# Patient Record
Sex: FEMALE | Race: WHITE | Employment: UNEMPLOYED | URBAN - NONMETROPOLITAN AREA
[De-identification: names, ages, dates, MRNs, and addresses within clinical notes are randomized per-mention and may not be internally consistent; named-entity substitution may affect disease eponyms.]

---

## 2018-09-19 ENCOUNTER — APPOINTMENT (OUTPATIENT)
Dept: GENERAL RADIOLOGY | Age: 43
DRG: 177 | End: 2018-09-19

## 2018-09-19 ENCOUNTER — HOSPITAL ENCOUNTER (INPATIENT)
Age: 43
LOS: 7 days | Discharge: HOME | DRG: 177 | End: 2018-09-26
Attending: INTERNAL MEDICINE | Admitting: INTERNAL MEDICINE
Payer: MEDICAID

## 2018-09-19 ENCOUNTER — APPOINTMENT (OUTPATIENT)
Dept: CT IMAGING | Age: 43
DRG: 177 | End: 2018-09-19

## 2018-09-19 DIAGNOSIS — J45.901 ASTHMA WITH ACUTE EXACERBATION, UNSPECIFIED ASTHMA SEVERITY, UNSPECIFIED WHETHER PERSISTENT: ICD-10-CM

## 2018-09-19 DIAGNOSIS — J15.211 PNEUMONIA OF LEFT LOWER LOBE DUE TO METHICILLIN SUSCEPTIBLE STAPHYLOCOCCUS AUREUS (MSSA) (HCC): ICD-10-CM

## 2018-09-19 DIAGNOSIS — J45.41 MODERATE PERSISTENT ASTHMATIC BRONCHITIS WITH ACUTE EXACERBATION: Primary | ICD-10-CM

## 2018-09-19 DIAGNOSIS — R09.02 HYPOXIA: ICD-10-CM

## 2018-09-19 LAB
ALLEN TEST: POSITIVE
ANION GAP SERPL CALCULATED.3IONS-SCNC: 11 MEQ/L (ref 8–16)
AVERAGE GLUCOSE: 90 MG/DL (ref 70–126)
BACTERIA: ABNORMAL
BASE EXCESS (CALCULATED): 1.2 MMOL/L (ref -2.5–2.5)
BASOPHILS # BLD: 0.4 %
BASOPHILS ABSOLUTE: 0 THOU/MM3 (ref 0–0.1)
BILIRUBIN URINE: NEGATIVE
BLOOD, URINE: NEGATIVE
BUN BLDV-MCNC: 13 MG/DL (ref 7–22)
CALCIUM SERPL-MCNC: 9.1 MG/DL (ref 8.5–10.5)
CASTS: ABNORMAL /LPF
CASTS: ABNORMAL /LPF
CHARACTER, URINE: CLEAR
CHLORIDE BLD-SCNC: 99 MEQ/L (ref 98–111)
CO2: 24 MEQ/L (ref 23–33)
COLLECTED BY:: ABNORMAL
COLOR: YELLOW
CREAT SERPL-MCNC: 0.7 MG/DL (ref 0.4–1.2)
CRYSTALS: ABNORMAL
DEVICE: ABNORMAL
EOSINOPHIL # BLD: 5.3 %
EOSINOPHILS ABSOLUTE: 0.6 THOU/MM3 (ref 0–0.4)
EPITHELIAL CELLS, UA: ABNORMAL /HPF
ERYTHROCYTE [DISTWIDTH] IN BLOOD BY AUTOMATED COUNT: 12.8 % (ref 11.5–14.5)
ERYTHROCYTE [DISTWIDTH] IN BLOOD BY AUTOMATED COUNT: 40.4 FL (ref 35–45)
GFR SERPL CREATININE-BSD FRML MDRD: > 90 ML/MIN/1.73M2
GLUCOSE BLD-MCNC: 121 MG/DL (ref 70–108)
GLUCOSE, URINE: 100 MG/DL
HBA1C MFR BLD: 5 % (ref 4.4–6.4)
HCO3: 28 MMOL/L (ref 23–28)
HCT VFR BLD CALC: 45.3 % (ref 37–47)
HEMOGLOBIN: 15.4 GM/DL (ref 12–16)
IFIO2: 4
IMMATURE GRANS (ABS): 0.06 THOU/MM3 (ref 0–0.07)
IMMATURE GRANULOCYTES: 0.5 %
KETONES, URINE: NEGATIVE
LACTIC ACID: 0.9 MMOL/L (ref 0.5–2.2)
LEUKOCYTE ESTERASE, URINE: NEGATIVE
LYMPHOCYTES # BLD: 9.6 %
LYMPHOCYTES ABSOLUTE: 1.1 THOU/MM3 (ref 1–4.8)
MCH RBC QN AUTO: 29.7 PG (ref 26–33)
MCHC RBC AUTO-ENTMCNC: 34 GM/DL (ref 32.2–35.5)
MCV RBC AUTO: 87.3 FL (ref 81–99)
MISCELLANEOUS LAB TEST RESULT: ABNORMAL
MONOCYTES # BLD: 9.7 %
MONOCYTES ABSOLUTE: 1.1 THOU/MM3 (ref 0.4–1.3)
NITRITE, URINE: NEGATIVE
NUCLEATED RED BLOOD CELLS: 0 /100 WBC
O2 SATURATION: 98 %
OSMOLALITY CALCULATION: 269.6 MOSMOL/KG (ref 275–300)
PCO2: 51 MMHG (ref 35–45)
PH BLOOD GAS: 7.35 (ref 7.35–7.45)
PH UA: 5.5
PLATELET # BLD: 312 THOU/MM3 (ref 130–400)
PMV BLD AUTO: 9 FL (ref 9.4–12.4)
PO2: 115 MMHG (ref 71–104)
POTASSIUM SERPL-SCNC: 4.1 MEQ/L (ref 3.5–5.2)
PROTEIN UA: NEGATIVE MG/DL
RBC # BLD: 5.19 MILL/MM3 (ref 4.2–5.4)
RBC URINE: ABNORMAL /HPF
RENAL EPITHELIAL, UA: ABNORMAL
SEG NEUTROPHILS: 74.5 %
SEGMENTED NEUTROPHILS ABSOLUTE COUNT: 8.7 THOU/MM3 (ref 1.8–7.7)
SODIUM BLD-SCNC: 134 MEQ/L (ref 135–145)
SOURCE, BLOOD GAS: ABNORMAL
SPECIFIC GRAVITY UA: 1.02 (ref 1–1.03)
TROPONIN T: 0.02 NG/ML
TROPONIN T: < 0.01 NG/ML
TSH SERPL DL<=0.05 MIU/L-ACNC: 1.86 UIU/ML (ref 0.4–4.2)
UROBILINOGEN, URINE: 0.2 EU/DL
WBC # BLD: 11.7 THOU/MM3 (ref 4.8–10.8)
WBC UA: ABNORMAL /HPF
YEAST: ABNORMAL

## 2018-09-19 PROCEDURE — 6360000004 HC RX CONTRAST MEDICATION: Performed by: PHYSICIAN ASSISTANT

## 2018-09-19 PROCEDURE — 84484 ASSAY OF TROPONIN QUANT: CPT

## 2018-09-19 PROCEDURE — 83036 HEMOGLOBIN GLYCOSYLATED A1C: CPT

## 2018-09-19 PROCEDURE — 71275 CT ANGIOGRAPHY CHEST: CPT

## 2018-09-19 PROCEDURE — 2709999900 HC NON-CHARGEABLE SUPPLY

## 2018-09-19 PROCEDURE — 94760 N-INVAS EAR/PLS OXIMETRY 1: CPT

## 2018-09-19 PROCEDURE — 6370000000 HC RX 637 (ALT 250 FOR IP): Performed by: FAMILY MEDICINE

## 2018-09-19 PROCEDURE — 1200000003 HC TELEMETRY R&B

## 2018-09-19 PROCEDURE — 94645 CONT INHLJ TX EACH ADDL HOUR: CPT

## 2018-09-19 PROCEDURE — 82803 BLOOD GASES ANY COMBINATION: CPT

## 2018-09-19 PROCEDURE — 96375 TX/PRO/DX INJ NEW DRUG ADDON: CPT

## 2018-09-19 PROCEDURE — 94640 AIRWAY INHALATION TREATMENT: CPT

## 2018-09-19 PROCEDURE — 93005 ELECTROCARDIOGRAM TRACING: CPT | Performed by: INTERNAL MEDICINE

## 2018-09-19 PROCEDURE — 99285 EMERGENCY DEPT VISIT HI MDM: CPT

## 2018-09-19 PROCEDURE — 2700000000 HC OXYGEN THERAPY PER DAY

## 2018-09-19 PROCEDURE — 6360000002 HC RX W HCPCS: Performed by: PHYSICIAN ASSISTANT

## 2018-09-19 PROCEDURE — 94644 CONT INHLJ TX 1ST HOUR: CPT

## 2018-09-19 PROCEDURE — 96374 THER/PROPH/DIAG INJ IV PUSH: CPT

## 2018-09-19 PROCEDURE — 99222 1ST HOSP IP/OBS MODERATE 55: CPT | Performed by: INTERNAL MEDICINE

## 2018-09-19 PROCEDURE — 6360000002 HC RX W HCPCS: Performed by: INTERNAL MEDICINE

## 2018-09-19 PROCEDURE — 81001 URINALYSIS AUTO W/SCOPE: CPT

## 2018-09-19 PROCEDURE — 6370000000 HC RX 637 (ALT 250 FOR IP): Performed by: PHYSICIAN ASSISTANT

## 2018-09-19 PROCEDURE — 85025 COMPLETE CBC W/AUTO DIFF WBC: CPT

## 2018-09-19 PROCEDURE — 6370000000 HC RX 637 (ALT 250 FOR IP): Performed by: INTERNAL MEDICINE

## 2018-09-19 PROCEDURE — 87040 BLOOD CULTURE FOR BACTERIA: CPT

## 2018-09-19 PROCEDURE — 2580000003 HC RX 258: Performed by: INTERNAL MEDICINE

## 2018-09-19 PROCEDURE — 84443 ASSAY THYROID STIM HORMONE: CPT

## 2018-09-19 PROCEDURE — 83605 ASSAY OF LACTIC ACID: CPT

## 2018-09-19 PROCEDURE — 87086 URINE CULTURE/COLONY COUNT: CPT

## 2018-09-19 PROCEDURE — 36600 WITHDRAWAL OF ARTERIAL BLOOD: CPT

## 2018-09-19 PROCEDURE — 6370000000 HC RX 637 (ALT 250 FOR IP): Performed by: EMERGENCY MEDICINE

## 2018-09-19 PROCEDURE — 36415 COLL VENOUS BLD VENIPUNCTURE: CPT

## 2018-09-19 PROCEDURE — 2500000003 HC RX 250 WO HCPCS: Performed by: INTERNAL MEDICINE

## 2018-09-19 PROCEDURE — 71046 X-RAY EXAM CHEST 2 VIEWS: CPT

## 2018-09-19 PROCEDURE — 80048 BASIC METABOLIC PNL TOTAL CA: CPT

## 2018-09-19 RX ORDER — ASPIRIN 325 MG
325 TABLET ORAL ONCE
Status: COMPLETED | OUTPATIENT
Start: 2018-09-19 | End: 2018-09-19

## 2018-09-19 RX ORDER — POTASSIUM CHLORIDE 7.45 MG/ML
10 INJECTION INTRAVENOUS PRN
Status: DISCONTINUED | OUTPATIENT
Start: 2018-09-19 | End: 2018-09-26 | Stop reason: HOSPADM

## 2018-09-19 RX ORDER — IPRATROPIUM BROMIDE AND ALBUTEROL SULFATE 2.5; .5 MG/3ML; MG/3ML
1 SOLUTION RESPIRATORY (INHALATION) ONCE
Status: DISCONTINUED | OUTPATIENT
Start: 2018-09-19 | End: 2018-09-19

## 2018-09-19 RX ORDER — POTASSIUM CHLORIDE 20MEQ/15ML
40 LIQUID (ML) ORAL PRN
Status: DISCONTINUED | OUTPATIENT
Start: 2018-09-19 | End: 2018-09-26 | Stop reason: HOSPADM

## 2018-09-19 RX ORDER — SODIUM CHLORIDE 0.9 % (FLUSH) 0.9 %
10 SYRINGE (ML) INJECTION PRN
Status: DISCONTINUED | OUTPATIENT
Start: 2018-09-19 | End: 2018-09-26 | Stop reason: HOSPADM

## 2018-09-19 RX ORDER — IPRATROPIUM BROMIDE AND ALBUTEROL SULFATE 2.5; .5 MG/3ML; MG/3ML
1 SOLUTION RESPIRATORY (INHALATION)
Status: DISCONTINUED | OUTPATIENT
Start: 2018-09-19 | End: 2018-09-25

## 2018-09-19 RX ORDER — OMEPRAZOLE 10 MG/1
10 CAPSULE, DELAYED RELEASE ORAL DAILY
COMMUNITY

## 2018-09-19 RX ORDER — METOPROLOL TARTRATE 5 MG/5ML
5 INJECTION INTRAVENOUS ONCE
Status: COMPLETED | OUTPATIENT
Start: 2018-09-19 | End: 2018-09-19

## 2018-09-19 RX ORDER — GUAIFENESIN 600 MG/1
600 TABLET, EXTENDED RELEASE ORAL 2 TIMES DAILY
Status: DISCONTINUED | OUTPATIENT
Start: 2018-09-19 | End: 2018-09-26 | Stop reason: HOSPADM

## 2018-09-19 RX ORDER — AZITHROMYCIN 250 MG/1
500 TABLET, FILM COATED ORAL ONCE
Status: COMPLETED | OUTPATIENT
Start: 2018-09-19 | End: 2018-09-19

## 2018-09-19 RX ORDER — ONDANSETRON 2 MG/ML
4 INJECTION INTRAMUSCULAR; INTRAVENOUS EVERY 6 HOURS PRN
Status: DISCONTINUED | OUTPATIENT
Start: 2018-09-19 | End: 2018-09-26 | Stop reason: HOSPADM

## 2018-09-19 RX ORDER — ACETAMINOPHEN 325 MG/1
650 TABLET ORAL EVERY 4 HOURS PRN
Status: DISCONTINUED | OUTPATIENT
Start: 2018-09-19 | End: 2018-09-26 | Stop reason: HOSPADM

## 2018-09-19 RX ORDER — NAPROXEN 250 MG/1
250 TABLET ORAL 2 TIMES DAILY WITH MEALS
COMMUNITY
End: 2020-01-27

## 2018-09-19 RX ORDER — BUSPIRONE HYDROCHLORIDE 10 MG/1
10 TABLET ORAL 3 TIMES DAILY
Status: DISCONTINUED | OUTPATIENT
Start: 2018-09-19 | End: 2018-09-26 | Stop reason: HOSPADM

## 2018-09-19 RX ORDER — IPRATROPIUM BROMIDE AND ALBUTEROL SULFATE 2.5; .5 MG/3ML; MG/3ML
1 SOLUTION RESPIRATORY (INHALATION) ONCE
Status: COMPLETED | OUTPATIENT
Start: 2018-09-19 | End: 2018-09-19

## 2018-09-19 RX ORDER — METHYLPREDNISOLONE SODIUM SUCCINATE 40 MG/ML
40 INJECTION, POWDER, LYOPHILIZED, FOR SOLUTION INTRAMUSCULAR; INTRAVENOUS EVERY 8 HOURS
Status: DISCONTINUED | OUTPATIENT
Start: 2018-09-19 | End: 2018-09-20

## 2018-09-19 RX ORDER — POTASSIUM CHLORIDE 20 MEQ/1
40 TABLET, EXTENDED RELEASE ORAL PRN
Status: DISCONTINUED | OUTPATIENT
Start: 2018-09-19 | End: 2018-09-26 | Stop reason: HOSPADM

## 2018-09-19 RX ORDER — ALBUTEROL SULFATE 2.5 MG/3ML
2.5 SOLUTION RESPIRATORY (INHALATION) ONCE
Status: COMPLETED | OUTPATIENT
Start: 2018-09-19 | End: 2018-09-19

## 2018-09-19 RX ORDER — SODIUM CHLORIDE 0.9 % (FLUSH) 0.9 %
10 SYRINGE (ML) INJECTION EVERY 12 HOURS SCHEDULED
Status: DISCONTINUED | OUTPATIENT
Start: 2018-09-19 | End: 2018-09-26 | Stop reason: HOSPADM

## 2018-09-19 RX ORDER — AZITHROMYCIN 250 MG/1
250 TABLET, FILM COATED ORAL DAILY
Status: DISCONTINUED | OUTPATIENT
Start: 2018-09-20 | End: 2018-09-20

## 2018-09-19 RX ORDER — 0.9 % SODIUM CHLORIDE 0.9 %
500 INTRAVENOUS SOLUTION INTRAVENOUS ONCE
Status: COMPLETED | OUTPATIENT
Start: 2018-09-19 | End: 2018-09-20

## 2018-09-19 RX ORDER — CETIRIZINE HYDROCHLORIDE 10 MG/1
10 TABLET ORAL PRN
COMMUNITY
End: 2020-01-27

## 2018-09-19 RX ORDER — DILTIAZEM HYDROCHLORIDE 5 MG/ML
10 INJECTION INTRAVENOUS ONCE
Status: COMPLETED | OUTPATIENT
Start: 2018-09-19 | End: 2018-09-19

## 2018-09-19 RX ORDER — METHYLPREDNISOLONE SODIUM SUCCINATE 125 MG/2ML
125 INJECTION, POWDER, LYOPHILIZED, FOR SOLUTION INTRAMUSCULAR; INTRAVENOUS ONCE
Status: COMPLETED | OUTPATIENT
Start: 2018-09-19 | End: 2018-09-19

## 2018-09-19 RX ORDER — NICOTINE 21 MG/24HR
1 PATCH, TRANSDERMAL 24 HOURS TRANSDERMAL DAILY
Status: DISCONTINUED | OUTPATIENT
Start: 2018-09-19 | End: 2018-09-20

## 2018-09-19 RX ORDER — LORAZEPAM 2 MG/ML
1 INJECTION INTRAMUSCULAR ONCE
Status: COMPLETED | OUTPATIENT
Start: 2018-09-19 | End: 2018-09-19

## 2018-09-19 RX ORDER — ACETAMINOPHEN 325 MG/1
650 TABLET ORAL ONCE
Status: COMPLETED | OUTPATIENT
Start: 2018-09-19 | End: 2018-09-19

## 2018-09-19 RX ADMIN — ALBUTEROL SULFATE 15 MG/HR: 2.5 SOLUTION RESPIRATORY (INHALATION) at 06:53

## 2018-09-19 RX ADMIN — METHYLPREDNISOLONE SODIUM SUCCINATE 40 MG: 40 INJECTION, POWDER, FOR SOLUTION INTRAMUSCULAR; INTRAVENOUS at 20:45

## 2018-09-19 RX ADMIN — IPRATROPIUM BROMIDE AND ALBUTEROL SULFATE 1 AMPULE: .5; 3 SOLUTION RESPIRATORY (INHALATION) at 21:51

## 2018-09-19 RX ADMIN — Medication 10 ML: at 20:45

## 2018-09-19 RX ADMIN — BUSPIRONE HYDROCHLORIDE 10 MG: 10 TABLET ORAL at 20:44

## 2018-09-19 RX ADMIN — GUAIFENESIN 600 MG: 600 TABLET, EXTENDED RELEASE ORAL at 15:01

## 2018-09-19 RX ADMIN — ALBUTEROL SULFATE 2.5 MG: 2.5 SOLUTION RESPIRATORY (INHALATION) at 06:29

## 2018-09-19 RX ADMIN — ALBUTEROL SULFATE 15 MG/HR: 2.5 SOLUTION RESPIRATORY (INHALATION) at 08:06

## 2018-09-19 RX ADMIN — ASPIRIN 325 MG: 325 TABLET ORAL at 20:49

## 2018-09-19 RX ADMIN — DILTIAZEM HYDROCHLORIDE 10 MG: 5 INJECTION INTRAVENOUS at 22:20

## 2018-09-19 RX ADMIN — BUSPIRONE HYDROCHLORIDE 10 MG: 10 TABLET ORAL at 15:01

## 2018-09-19 RX ADMIN — ACETAMINOPHEN 650 MG: 325 TABLET ORAL at 10:56

## 2018-09-19 RX ADMIN — METHYLPREDNISOLONE SODIUM SUCCINATE 125 MG: 125 INJECTION, POWDER, FOR SOLUTION INTRAMUSCULAR; INTRAVENOUS at 06:55

## 2018-09-19 RX ADMIN — AZITHROMYCIN 500 MG: 250 TABLET, FILM COATED ORAL at 17:09

## 2018-09-19 RX ADMIN — ACETAMINOPHEN 650 MG: 325 TABLET ORAL at 22:22

## 2018-09-19 RX ADMIN — LORAZEPAM 1 MG: 2 INJECTION INTRAMUSCULAR; INTRAVENOUS at 10:01

## 2018-09-19 RX ADMIN — SODIUM CHLORIDE 500 ML: 9 INJECTION, SOLUTION INTRAVENOUS at 22:20

## 2018-09-19 RX ADMIN — METOPROLOL TARTRATE 5 MG: 1 INJECTION, SOLUTION INTRAVENOUS at 18:04

## 2018-09-19 RX ADMIN — GUAIFENESIN 600 MG: 600 TABLET, EXTENDED RELEASE ORAL at 20:44

## 2018-09-19 RX ADMIN — IPRATROPIUM BROMIDE AND ALBUTEROL SULFATE 1 AMPULE: .5; 3 SOLUTION RESPIRATORY (INHALATION) at 15:45

## 2018-09-19 RX ADMIN — IPRATROPIUM BROMIDE AND ALBUTEROL SULFATE 1 AMPULE: .5; 3 SOLUTION RESPIRATORY (INHALATION) at 06:05

## 2018-09-19 RX ADMIN — IOPAMIDOL 85 ML: 755 INJECTION, SOLUTION INTRAVENOUS at 09:08

## 2018-09-19 RX ADMIN — ENOXAPARIN SODIUM 40 MG: 40 INJECTION SUBCUTANEOUS at 15:01

## 2018-09-19 ASSESSMENT — PAIN DESCRIPTION - PAIN TYPE
TYPE: ACUTE PAIN
TYPE: ACUTE PAIN

## 2018-09-19 ASSESSMENT — PAIN SCALES - GENERAL
PAINLEVEL_OUTOF10: 7
PAINLEVEL_OUTOF10: 3
PAINLEVEL_OUTOF10: 5
PAINLEVEL_OUTOF10: 6

## 2018-09-19 ASSESSMENT — ENCOUNTER SYMPTOMS
NAUSEA: 0
DIARRHEA: 0
EYE PAIN: 0
SORE THROAT: 1
SHORTNESS OF BREATH: 1
COUGH: 1
RHINORRHEA: 1
EYE DISCHARGE: 0
BACK PAIN: 0
ABDOMINAL PAIN: 0
WHEEZING: 1
VOMITING: 0

## 2018-09-19 ASSESSMENT — PAIN DESCRIPTION - ORIENTATION: ORIENTATION: LEFT

## 2018-09-19 ASSESSMENT — PAIN DESCRIPTION - DESCRIPTORS
DESCRIPTORS: HEADACHE
DESCRIPTORS: TIGHTNESS

## 2018-09-19 ASSESSMENT — PAIN DESCRIPTION - LOCATION
LOCATION: CHEST
LOCATION: HEAD

## 2018-09-19 NOTE — ED TRIAGE NOTES
Pt presents to the ED through triage with complaints of SOB, wheezing, and cough. Pt states that she started to get SOB yesterday morning. Hx of asthma. EKG completed. Pt has labored breathing upon arrival.  States nonproductive cough. Pt alert and oriented. VS and assessment as documented.

## 2018-09-19 NOTE — PROGRESS NOTES
Dr Jasmin Reyna paged again regarding patients heart rate. Patients heart rate continues to be in the 120s. Orders for urinalysis.

## 2018-09-19 NOTE — ED PROVIDER NOTES
exudate, posterior oropharyngeal edema or posterior oropharyngeal erythema. Eyes: Conjunctivae and EOM are normal.   Neck: Normal range of motion. Neck supple. No JVD present. Cardiovascular: Regular rhythm, normal heart sounds, intact distal pulses and normal pulses. Tachycardia present. Exam reveals no gallop and no friction rub. No murmur heard. Pulmonary/Chest: Effort normal. Tachypnea noted. No respiratory distress. She has no decreased breath sounds. She has wheezes. She has no rhonchi. She has no rales. Wheezing throughout. Tachypnea. Negative pedal edema. Abdominal: Soft. Bowel sounds are normal. She exhibits no distension. There is no tenderness. There is no rebound, no guarding and no CVA tenderness. Musculoskeletal: Normal range of motion. She exhibits no edema. Neurological: She is alert and oriented to person, place, and time. She exhibits normal muscle tone. Coordination normal.   Skin: Skin is warm and dry. No rash noted. She is not diaphoretic. Nursing note and vitals reviewed. DIFFERENTIAL DIAGNOSIS:   Were discussed with the patient. DIAGNOSTIC RESULTS     EKG: All EKG's are interpreted by the Emergency Department Physician who either signs or Co-signs this chart in the absence of a cardiologist.    Jess Nails. Rate: 119 bpm  OR interval: 156 ms  QRS duration: 80 ms  QTc: 458 ms  P-R-T axes: 80, 77, 72  Sinus Tachycardia. No STEMI. RADIOLOGY: non-plain film images(s) such as CT, Ultrasound and MRI are read by the radiologist.    CTA Chest W WO Contrast   Final Result   Within The limitations discussed above there is no acute process identified. No central or proximal branch pulmonary emboli are seen. **This report has been created using voice recognition software. It may contain minor errors which are inherent in voice recognition technology. **      Final report electronically signed by Dr. Nandini Koch on 9/19/2018 9:30 AM      XR CHEST STANDARD (2 VW)   Final Result   1. Unremarkable PA and lateral views of the chest.            **This report has been created using voice recognition software. It may contain minor errors which are inherent in voice recognition technology. **      Final report electronically signed by Dr. Brooks العلي on 9/19/2018 6:59 AM          LABS:     Labs Reviewed   CBC WITH AUTO DIFFERENTIAL - Abnormal; Notable for the following:        Result Value    WBC 11.7 (*)     MPV 9.0 (*)     Segs Absolute 8.7 (*)     Eosinophils # 0.6 (*)     All other components within normal limits   BASIC METABOLIC PANEL - Abnormal; Notable for the following:     Sodium 134 (*)     Glucose 121 (*)     All other components within normal limits   BLOOD GAS, ARTERIAL - Abnormal; Notable for the following:     PCO2 51 (*)     PO2 115 (*)     All other components within normal limits   OSMOLALITY - Abnormal; Notable for the following:     Osmolality Calc 269.6 (*)     All other components within normal limits   CULTURE BLOOD #1   CULTURE BLOOD #2   TROPONIN   LACTIC ACID, PLASMA   ANION GAP   GLOMERULAR FILTRATION RATE, ESTIMATED       EMERGENCY DEPARTMENT COURSE:   Vitals:    Vitals:    09/19/18 0941 09/19/18 0948 09/19/18 0950 09/19/18 1057   BP:    128/60   Pulse:   134 130   Resp:   30 24   Temp:       TempSrc:       SpO2: 92% (!) 88% 94% 94%   Weight:       Height:           6:34 AM: The patient was seen and evaluated. MDM:  The patient was evaluated in the ED for shortness of breath, wheezing, and cough. She is 83% on room air upon arrival. Upon examination, Wheezing throughout. Tachypnea. Negative pedal edema. Tachycardia. Mild respiratory distress. Radiological results showed Unremarkable PA and lateral views of the chest. Laboratory results revealed negative troponin, WBC 11.7, PO2 115, PCO2 51, and sodium 134. Patient was initially treated with DuoNeb with minimal improvement.   She was given albuterol treatment as well as 2 hour albuterol with Solu-Medrol

## 2018-09-19 NOTE — PLAN OF CARE
Problem: Impaired respiratory status  Goal: Clear lung sounds  Outcome: Ongoing  Pt has expiratory wheezing. txs to help improve lung aeration.

## 2018-09-19 NOTE — H&P
chest.      Past Medical History         Diagnosis Date    Anxiety     Asthma     Obesity        Past Surgical History         Procedure Laterality Date    CARDIOVASCULAR STRESS TEST  08/24/11    EF 67%    DIAGNOSTIC CARDIAC CATH LAB PROCEDURE  08/29/11    EF 60%    DOPPLER ECHOCARDIOGRAPHY  08/23/11    EF 60%    KNEE ARTHROSCOPY      vicky knee    TONSILLECTOMY           Medications prior to admission      Prior to Admission medications    Medication Sig Start Date End Date Taking? Authorizing Provider   naproxen (NAPROSYN) 250 MG tablet Take 250 mg by mouth 2 times daily (with meals)   Yes Historical Provider, MD   omeprazole (PRILOSEC) 10 MG delayed release capsule Take 10 mg by mouth daily   Yes Historical Provider, MD   cetirizine (ZYRTEC) 10 MG tablet Take 10 mg by mouth daily   Yes Historical Provider, MD       Allergies     Patient has no known allergies. Family History       Family History   Problem Relation Age of Onset    Hypertension Mother     Hypertension Father        Social History       Social History     Social History    Marital status:      Spouse name: N/A    Number of children: N/A    Years of education: N/A     Social History Main Topics    Smoking status: Current Every Day Smoker     Packs/day: 1.00     Years: 14.00     Types: Cigarettes    Smokeless tobacco: Never Used    Alcohol use No    Drug use: No    Sexual activity: Not Asked     Other Topics Concern    None     Social History Narrative    None     TOBACCO:   reports that she has been smoking Cigarettes. She has a 14.00 pack-year smoking history. She has never used smokeless tobacco.  ETOH:   reports that she does not drink alcohol. Review of systems     Pertinent positives as noted in the HPI. All other systems reviewed and negative.     ROS      Physical examination     /60   Pulse 128   Temp 98 °F (36.7 °C) (Oral)   Resp 22   Ht 5' 9\" (1.753 m)   Wt (!) 315 lb 8 oz (143.1 kg)   SpO2 94% BMI 46.59 kg/m²     General appearance:  No apparent distress, appears stated age and cooperative. Obese. HEENT:  Normocephalic. Pupils equal, round, and reactive to light. Extraocular motion intact. Conjunctivae/corneas clear. Nose symmetric without evidence of discharge. Oral mucosa moist without erythema or exudate. Neck: Supple, with full range of motion. No thyromegaly. No lymph node swelling. Cardiovascular:  Regular rate and rhythm with normal S1/S2 without murmurs, rubs or gallops. Respiratory:  Poor air movement with diffuse wheezing intermittently  Abdomen: Soft, non-tender, non-distended with normal bowel sounds. Musculoskeletal:  Full range of motion without deformity. Neurologic: No focal sensory/motor deficits. Cranial nerves: II-XII intact, grossly non-focal.  Lymphatic: No cervical lymphadenopathy. Psychiatric:  Alert and oriented. Normal judgement. Conversational.  Vascular: Dorsalis pedis and radial pulses palpable bilaterally 2+. Genitourinary: Deferred  Skin: Diffuse psoriatic skin lesions. Labs and imaging     Recent Labs      09/19/18   0600   WBC  11.7*   HGB  15.4   HCT  45.3   PLT  312     Recent Labs      09/19/18   0600   NA  134*   K  4.1   CL  99   CO2  24   BUN  13   CREATININE  0.7   CALCIUM  9.1     No results for input(s): AST, ALT, BILIDIR, BILITOT, ALKPHOS in the last 72 hours. No results for input(s): INR in the last 72 hours. No results for input(s): Margarito Car in the last 72 hours. Urinalysis:      Lab Results   Component Value Date    NITRU NEGATIVE 11/22/2015    WBCUA 5-10 02/07/2014    BACTERIA FEW 02/07/2014    RBCUA 0-2 02/07/2014    BLOODU NEGATIVE 11/22/2015    SPECGRAV >1.030 11/22/2015     Radiology:     CXR: I have reviewed the CXR with the following interpretation: Increased pulmonary vascularity with no evidence for hyperinflation of the lungs no evidence of pneumonia.   EKG:  I have reviewed the EKG with the following interpretation:

## 2018-09-19 NOTE — PROGRESS NOTES
Dr Jose Miguel Ndiaye updated on heart rate in the 120s. Also patient requesting something for anxiety. New orders received.

## 2018-09-20 LAB
ALLEN TEST: POSITIVE
ANION GAP SERPL CALCULATED.3IONS-SCNC: 10 MEQ/L (ref 8–16)
BASE EXCESS (CALCULATED): 2.6 MMOL/L (ref -2.5–2.5)
BUN BLDV-MCNC: 11 MG/DL (ref 7–22)
CALCIUM SERPL-MCNC: 9.3 MG/DL (ref 8.5–10.5)
CHLORIDE BLD-SCNC: 103 MEQ/L (ref 98–111)
CO2: 25 MEQ/L (ref 23–33)
COLLECTED BY:: ABNORMAL
CREAT SERPL-MCNC: 0.7 MG/DL (ref 0.4–1.2)
DEVICE: ABNORMAL
EKG ATRIAL RATE: 109 BPM
EKG ATRIAL RATE: 119 BPM
EKG ATRIAL RATE: 124 BPM
EKG P AXIS: 47 DEGREES
EKG P AXIS: 55 DEGREES
EKG P AXIS: 80 DEGREES
EKG P-R INTERVAL: 148 MS
EKG P-R INTERVAL: 154 MS
EKG P-R INTERVAL: 156 MS
EKG Q-T INTERVAL: 324 MS
EKG Q-T INTERVAL: 326 MS
EKG Q-T INTERVAL: 342 MS
EKG QRS DURATION: 80 MS
EKG QRS DURATION: 80 MS
EKG QRS DURATION: 82 MS
EKG QTC CALCULATION (BAZETT): 458 MS
EKG QTC CALCULATION (BAZETT): 460 MS
EKG QTC CALCULATION (BAZETT): 465 MS
EKG R AXIS: 50 DEGREES
EKG R AXIS: 59 DEGREES
EKG R AXIS: 77 DEGREES
EKG T AXIS: 30 DEGREES
EKG T AXIS: 36 DEGREES
EKG T AXIS: 72 DEGREES
EKG VENTRICULAR RATE: 109 BPM
EKG VENTRICULAR RATE: 119 BPM
EKG VENTRICULAR RATE: 124 BPM
ERYTHROCYTE [DISTWIDTH] IN BLOOD BY AUTOMATED COUNT: 13.5 % (ref 11.5–14.5)
ERYTHROCYTE [DISTWIDTH] IN BLOOD BY AUTOMATED COUNT: 43.8 FL (ref 35–45)
FLU A ANTIGEN: NEGATIVE
FLU B ANTIGEN: NEGATIVE
GFR SERPL CREATININE-BSD FRML MDRD: > 90 ML/MIN/1.73M2
GLUCOSE BLD-MCNC: 153 MG/DL (ref 70–108)
GLUCOSE BLD-MCNC: 155 MG/DL (ref 70–108)
HCO3: 28 MMOL/L (ref 23–28)
HCT VFR BLD CALC: 43.9 % (ref 37–47)
HEMOGLOBIN: 14.6 GM/DL (ref 12–16)
IFIO2: 2
LV EF: 63 %
LVEF MODALITY: NORMAL
MAGNESIUM: 2.2 MG/DL (ref 1.6–2.4)
MCH RBC QN AUTO: 29.8 PG (ref 26–33)
MCHC RBC AUTO-ENTMCNC: 33.3 GM/DL (ref 32.2–35.5)
MCV RBC AUTO: 89.6 FL (ref 81–99)
O2 SATURATION: 93 %
ORGANISM: ABNORMAL
PATHOLOGIST REVIEW: ABNORMAL
PCO2: 42 MMHG (ref 35–45)
PH BLOOD GAS: 7.42 (ref 7.35–7.45)
PHOSPHORUS: 2.5 MG/DL (ref 2.4–4.7)
PLATELET # BLD: 356 THOU/MM3 (ref 130–400)
PMV BLD AUTO: 9.1 FL (ref 9.4–12.4)
PO2: 67 MMHG (ref 71–104)
POTASSIUM SERPL-SCNC: 4.9 MEQ/L (ref 3.5–5.2)
PROCALCITONIN: 0.09 NG/ML (ref 0.01–0.09)
RBC # BLD: 4.9 MILL/MM3 (ref 4.2–5.4)
SODIUM BLD-SCNC: 138 MEQ/L (ref 135–145)
SOURCE, BLOOD GAS: ABNORMAL
TROPONIN T: 0.01 NG/ML
TROPONIN T: < 0.01 NG/ML
URINE CULTURE, ROUTINE: ABNORMAL
WBC # BLD: 32.1 THOU/MM3 (ref 4.8–10.8)

## 2018-09-20 PROCEDURE — 2580000003 HC RX 258: Performed by: INTERNAL MEDICINE

## 2018-09-20 PROCEDURE — 80048 BASIC METABOLIC PNL TOTAL CA: CPT

## 2018-09-20 PROCEDURE — 83735 ASSAY OF MAGNESIUM: CPT

## 2018-09-20 PROCEDURE — 36600 WITHDRAWAL OF ARTERIAL BLOOD: CPT

## 2018-09-20 PROCEDURE — 84100 ASSAY OF PHOSPHORUS: CPT

## 2018-09-20 PROCEDURE — 99223 1ST HOSP IP/OBS HIGH 75: CPT | Performed by: INTERNAL MEDICINE

## 2018-09-20 PROCEDURE — 6370000000 HC RX 637 (ALT 250 FOR IP): Performed by: FAMILY MEDICINE

## 2018-09-20 PROCEDURE — 87804 INFLUENZA ASSAY W/OPTIC: CPT

## 2018-09-20 PROCEDURE — 6370000000 HC RX 637 (ALT 250 FOR IP): Performed by: INTERNAL MEDICINE

## 2018-09-20 PROCEDURE — 99233 SBSQ HOSP IP/OBS HIGH 50: CPT | Performed by: INTERNAL MEDICINE

## 2018-09-20 PROCEDURE — 94640 AIRWAY INHALATION TREATMENT: CPT

## 2018-09-20 PROCEDURE — 1200000003 HC TELEMETRY R&B

## 2018-09-20 PROCEDURE — 93005 ELECTROCARDIOGRAM TRACING: CPT | Performed by: INTERNAL MEDICINE

## 2018-09-20 PROCEDURE — 6360000002 HC RX W HCPCS: Performed by: INTERNAL MEDICINE

## 2018-09-20 PROCEDURE — 93306 TTE W/DOPPLER COMPLETE: CPT

## 2018-09-20 PROCEDURE — 93010 ELECTROCARDIOGRAM REPORT: CPT | Performed by: NUCLEAR MEDICINE

## 2018-09-20 PROCEDURE — 85027 COMPLETE CBC AUTOMATED: CPT

## 2018-09-20 PROCEDURE — 99254 IP/OBS CNSLTJ NEW/EST MOD 60: CPT | Performed by: INTERNAL MEDICINE

## 2018-09-20 PROCEDURE — 84484 ASSAY OF TROPONIN QUANT: CPT

## 2018-09-20 PROCEDURE — 36415 COLL VENOUS BLD VENIPUNCTURE: CPT

## 2018-09-20 PROCEDURE — 82803 BLOOD GASES ANY COMBINATION: CPT

## 2018-09-20 PROCEDURE — 2700000000 HC OXYGEN THERAPY PER DAY

## 2018-09-20 PROCEDURE — 82948 REAGENT STRIP/BLOOD GLUCOSE: CPT

## 2018-09-20 PROCEDURE — 93010 ELECTROCARDIOGRAM REPORT: CPT | Performed by: INTERNAL MEDICINE

## 2018-09-20 PROCEDURE — 94760 N-INVAS EAR/PLS OXIMETRY 1: CPT

## 2018-09-20 PROCEDURE — 84145 PROCALCITONIN (PCT): CPT

## 2018-09-20 RX ORDER — CALCIUM CARBONATE 200(500)MG
500 TABLET,CHEWABLE ORAL 3 TIMES DAILY PRN
Status: DISCONTINUED | OUTPATIENT
Start: 2018-09-20 | End: 2018-09-26 | Stop reason: HOSPADM

## 2018-09-20 RX ORDER — ALBUTEROL SULFATE 90 UG/1
2 AEROSOL, METERED RESPIRATORY (INHALATION) EVERY 4 HOURS PRN
Status: DISCONTINUED | OUTPATIENT
Start: 2018-09-20 | End: 2018-09-26 | Stop reason: HOSPADM

## 2018-09-20 RX ORDER — HYDROXYZINE HYDROCHLORIDE 25 MG/1
25 TABLET, FILM COATED ORAL 4 TIMES DAILY PRN
Status: DISCONTINUED | OUTPATIENT
Start: 2018-09-20 | End: 2018-09-26 | Stop reason: HOSPADM

## 2018-09-20 RX ORDER — ASPIRIN 81 MG/1
81 TABLET ORAL DAILY
Status: DISCONTINUED | OUTPATIENT
Start: 2018-09-20 | End: 2018-09-26 | Stop reason: HOSPADM

## 2018-09-20 RX ORDER — METHYLPREDNISOLONE SODIUM SUCCINATE 40 MG/ML
40 INJECTION, POWDER, LYOPHILIZED, FOR SOLUTION INTRAMUSCULAR; INTRAVENOUS EVERY 6 HOURS
Status: DISCONTINUED | OUTPATIENT
Start: 2018-09-20 | End: 2018-09-20

## 2018-09-20 RX ORDER — METHYLPREDNISOLONE SODIUM SUCCINATE 40 MG/ML
40 INJECTION, POWDER, LYOPHILIZED, FOR SOLUTION INTRAMUSCULAR; INTRAVENOUS EVERY 12 HOURS
Status: DISCONTINUED | OUTPATIENT
Start: 2018-09-21 | End: 2018-09-25

## 2018-09-20 RX ORDER — ALBUTEROL SULFATE 2.5 MG/3ML
2.5 SOLUTION RESPIRATORY (INHALATION) EVERY 6 HOURS PRN
Status: DISCONTINUED | OUTPATIENT
Start: 2018-09-20 | End: 2018-09-26 | Stop reason: HOSPADM

## 2018-09-20 RX ORDER — NICOTINE POLACRILEX 4 MG
15 LOZENGE BUCCAL PRN
Status: DISCONTINUED | OUTPATIENT
Start: 2018-09-20 | End: 2018-09-26 | Stop reason: HOSPADM

## 2018-09-20 RX ORDER — FAMOTIDINE 20 MG/1
20 TABLET, FILM COATED ORAL 2 TIMES DAILY
Status: DISCONTINUED | OUTPATIENT
Start: 2018-09-20 | End: 2018-09-21

## 2018-09-20 RX ORDER — DEXTROSE MONOHYDRATE 25 G/50ML
12.5 INJECTION, SOLUTION INTRAVENOUS PRN
Status: DISCONTINUED | OUTPATIENT
Start: 2018-09-20 | End: 2018-09-26 | Stop reason: HOSPADM

## 2018-09-20 RX ORDER — DEXTROSE MONOHYDRATE 50 MG/ML
100 INJECTION, SOLUTION INTRAVENOUS PRN
Status: DISCONTINUED | OUTPATIENT
Start: 2018-09-20 | End: 2018-09-26 | Stop reason: HOSPADM

## 2018-09-20 RX ORDER — SODIUM CHLORIDE 9 MG/ML
INJECTION, SOLUTION INTRAVENOUS CONTINUOUS
Status: DISCONTINUED | OUTPATIENT
Start: 2018-09-20 | End: 2018-09-20

## 2018-09-20 RX ADMIN — BUSPIRONE HYDROCHLORIDE 10 MG: 10 TABLET ORAL at 15:37

## 2018-09-20 RX ADMIN — DILTIAZEM HYDROCHLORIDE 30 MG: 30 TABLET, FILM COATED ORAL at 17:27

## 2018-09-20 RX ADMIN — ASPIRIN 81 MG: 81 TABLET, COATED ORAL at 17:29

## 2018-09-20 RX ADMIN — Medication 10 ML: at 20:24

## 2018-09-20 RX ADMIN — ENOXAPARIN SODIUM 40 MG: 40 INJECTION SUBCUTANEOUS at 03:00

## 2018-09-20 RX ADMIN — GUAIFENESIN 600 MG: 600 TABLET, EXTENDED RELEASE ORAL at 20:24

## 2018-09-20 RX ADMIN — ACETAMINOPHEN 650 MG: 325 TABLET ORAL at 10:24

## 2018-09-20 RX ADMIN — IPRATROPIUM BROMIDE AND ALBUTEROL SULFATE 1 AMPULE: .5; 3 SOLUTION RESPIRATORY (INHALATION) at 17:37

## 2018-09-20 RX ADMIN — ACETAMINOPHEN 650 MG: 325 TABLET ORAL at 22:28

## 2018-09-20 RX ADMIN — FAMOTIDINE 20 MG: 20 TABLET ORAL at 20:24

## 2018-09-20 RX ADMIN — BUSPIRONE HYDROCHLORIDE 10 MG: 10 TABLET ORAL at 20:23

## 2018-09-20 RX ADMIN — ANTACID TABLETS 500 MG: 500 TABLET, CHEWABLE ORAL at 20:47

## 2018-09-20 RX ADMIN — INSULIN LISPRO 1 UNITS: 100 INJECTION, SOLUTION INTRAVENOUS; SUBCUTANEOUS at 21:47

## 2018-09-20 RX ADMIN — IPRATROPIUM BROMIDE AND ALBUTEROL SULFATE 1 AMPULE: .5; 3 SOLUTION RESPIRATORY (INHALATION) at 08:48

## 2018-09-20 RX ADMIN — METHYLPREDNISOLONE SODIUM SUCCINATE 40 MG: 40 INJECTION, POWDER, FOR SOLUTION INTRAMUSCULAR; INTRAVENOUS at 15:37

## 2018-09-20 RX ADMIN — ENOXAPARIN SODIUM 40 MG: 40 INJECTION SUBCUTANEOUS at 20:24

## 2018-09-20 RX ADMIN — METHYLPREDNISOLONE SODIUM SUCCINATE 40 MG: 40 INJECTION, POWDER, FOR SOLUTION INTRAMUSCULAR; INTRAVENOUS at 03:16

## 2018-09-20 RX ADMIN — BUSPIRONE HYDROCHLORIDE 10 MG: 10 TABLET ORAL at 09:38

## 2018-09-20 RX ADMIN — SODIUM CHLORIDE: 9 INJECTION, SOLUTION INTRAVENOUS at 15:32

## 2018-09-20 RX ADMIN — AZITHROMYCIN 250 MG: 250 TABLET, FILM COATED ORAL at 09:38

## 2018-09-20 RX ADMIN — Medication 10 ML: at 10:28

## 2018-09-20 RX ADMIN — METHYLPREDNISOLONE SODIUM SUCCINATE 40 MG: 40 INJECTION, POWDER, FOR SOLUTION INTRAMUSCULAR; INTRAVENOUS at 10:26

## 2018-09-20 RX ADMIN — IPRATROPIUM BROMIDE AND ALBUTEROL SULFATE 1 AMPULE: .5; 3 SOLUTION RESPIRATORY (INHALATION) at 13:31

## 2018-09-20 RX ADMIN — IPRATROPIUM BROMIDE AND ALBUTEROL SULFATE 1 AMPULE: .5; 3 SOLUTION RESPIRATORY (INHALATION) at 22:36

## 2018-09-20 RX ADMIN — GUAIFENESIN 600 MG: 600 TABLET, EXTENDED RELEASE ORAL at 09:38

## 2018-09-20 RX ADMIN — ANTACID TABLETS 500 MG: 500 TABLET, CHEWABLE ORAL at 15:39

## 2018-09-20 ASSESSMENT — PAIN SCALES - GENERAL
PAINLEVEL_OUTOF10: 7
PAINLEVEL_OUTOF10: 0
PAINLEVEL_OUTOF10: 0
PAINLEVEL_OUTOF10: 3
PAINLEVEL_OUTOF10: 4

## 2018-09-20 NOTE — CONSULTS
Years of education: N/A     Occupational History    Not on file. Social History Main Topics    Smoking status: Current Every Day Smoker     Packs/day: 1.00     Years: 14.00     Types: Cigarettes    Smokeless tobacco: Never Used    Alcohol use No    Drug use: No    Sexual activity: Not on file     Other Topics Concern    Not on file     Social History Narrative    No narrative on file       Riview of systems   General/Constitutional: No recent loss of weight or appetite changes. No fever or chills. HENT: Negative. Eyes: Negative. Upper respiratory tract: Occasional nasal stuffiness with no post nasal drip. Lower respiratory tract/ lungs: See HPI for details. No hemoptysis. Cardiovascular: No palpitations or chest pain. Gastrointestinal: No nausea or vomiting. Neurological: No focal neurologiacal weakness. Extremities: No edema. Musculoskeletal: No complaints. Genitourinary: No complaints. Hematological: Negative. Psychiatric/Behavioral: Negative. Skin: No itching. Vitals     height is 5' 9\" (1.753 m) and weight is 311 lb 8 oz (141.3 kg) (abnormal). Her oral temperature is 98.3 °F (36.8 °C). Her blood pressure is 125/69 and her pulse is 119. Her respiration is 16 and oxygen saturation is 92%. Body mass index is 46 kg/m². SUPPLEMENTAL O2: O2 Flow Rate (L/min): 2 L/min       I/O      Intake/Output Summary (Last 24 hours) at 09/20/18 1534  Last data filed at 09/20/18 1321   Gross per 24 hour   Intake             1440 ml   Output             1700 ml   Net             -260 ml     I/O last 3 completed shifts:   In: 1440 [P.O.:940; I.V.:500]  Out: 1700 [Urine:1700]   Patient Vitals for the past 96 hrs (Last 3 readings):   Weight   09/20/18 0307 (!) 311 lb 8 oz (141.3 kg)   09/19/18 1200 (!) 315 lb 8 oz (143.1 kg)   09/19/18 0555 (!) 315 lb (142.9 kg)       Exam   General Appearance: moderately built, moderately nourished in no acute distress on O2 via nasal cannula at 2Lpm  HEENT: Normal,

## 2018-09-20 NOTE — PLAN OF CARE
Problem: Falls - Risk of:  Goal: Will remain free from falls  Will remain free from falls   Outcome: Ongoing  No falls noted this shift. Continue falling star program. Bed alarm on, bed in low position. Call light and personal belongings in reach. Patient uses call light appropriately. Problem: Pain:  Goal: Pain level will decrease  Pain level will decrease   Outcome: Ongoing  Pt complaining of pain this shift. Prn pain medication available. Will monitor. Problem: Discharge Planning:  Goal: Discharged to appropriate level of care  Discharged to appropriate level of care  Outcome: Ongoing  Pt plans to return home at discharge. Will continue to assess discharge needs. Problem: Anxiety/Stress:  Goal: Level of anxiety will decrease  Level of anxiety will decrease  Outcome: Ongoing  Pt complaining of anxiety this shift. Pt taking buspar. Will monitor. Problem: Cardiac Output - Decreased:  Goal: Hemodynamic stability will improve  Hemodynamic stability will improve  Outcome: Ongoing  Vitals:    09/20/18 0000   BP: 124/62   Pulse: 108   Resp: 18   Temp: 98.5 °F (36.9 °C)   SpO2: 91%         Problem: Gas Exchange - Impaired:  Goal: Levels of oxygenation will improve  Levels of oxygenation will improve  Outcome: Ongoing  O2 >90% on RA this shift. Will monitor. Comments: Care plan reviewed with patient. Patient verbalizes understanding of plan of care and contributes to goal setting.

## 2018-09-20 NOTE — CONSULTS
then 0.018,  then 0.013. Blood chemistry:  Sodium 138, potassium 4.9, BUN 11,  creatinine 0.7. Hematology:  White blood cells 32,000 today, but yesterday  11,000.7, so today she has been on steroid overnight; hemoglobin 14.6;  platelet 489. Chest x-ray showed increased bronchovascular marking  bilaterally and no evidence of definite congestion. CT of the chest with contrast no evidence of infiltrate or effusion and  there is no evidence of pulmonary thromboembolism. Blood gas was done pH 7.35, pCO2 of 51, and bicarb was 28 and so suggestive  for there is underlying COPD. ASSESSMENT:  1. Acute respiratory failure probably basically hypoxic with hypercapnia,  hypercapnic on admission at least pCO2 is 51.  2.  Acute asthmatic exacerbation on top of acute COPD exacerbation. 3.  Hypoxia. 4.  Morbid obesity. 5.  Elevated troponin secondary to hypercapnic respiratory failure. 6.  Sinus tachycardia secondary to breathing treatment as the patient is  getting. PLAN AND RECOMMENDATION:  I agree with Acapella breathing treatment,  steroids and Mucinex DM and it is prudent that the patient has been also on  antibiotics. So, basically to be treated as COPD exacerbation. The patient's pCO2 is  quite high, more than normal at least and so. With regard to the elevated troponin, this seems to be more of secondary  rather than primary, but we will repeat one set of troponin and I will  start her on aspirin and the patient needs functional study once she gets  over this COPD exacerbation/asthma exacerbation. She needs a functional  study and we will get an echocardiogram.  Based on the finding, we will  gauge further care. Otherwise, it looks like more of secondary troponin  elevation secondary to respiratory failure rather than primary. Based on the course, we will gauge further care. Thank you for allowing us to participate in the care of this patient. I  will follow up with you. Anuel Borja. Lacie Estrada M.D.    D: 09/20/2018 11:47:56       T: 09/20/2018 11:51:07     [email or web address]  Job#: [id number]     Doc#: [id number]    CC:

## 2018-09-21 ENCOUNTER — APPOINTMENT (OUTPATIENT)
Dept: CT IMAGING | Age: 43
DRG: 177 | End: 2018-09-21

## 2018-09-21 LAB
ALBUMIN SERPL-MCNC: 3.9 G/DL (ref 3.5–5.1)
ALP BLD-CCNC: 67 U/L (ref 38–126)
ALT SERPL-CCNC: 28 U/L (ref 11–66)
ANION GAP SERPL CALCULATED.3IONS-SCNC: 13 MEQ/L (ref 8–16)
AST SERPL-CCNC: 26 U/L (ref 5–40)
BILIRUB SERPL-MCNC: 0.2 MG/DL (ref 0.3–1.2)
BUN BLDV-MCNC: 18 MG/DL (ref 7–22)
CALCIUM SERPL-MCNC: 9.3 MG/DL (ref 8.5–10.5)
CHLORIDE BLD-SCNC: 103 MEQ/L (ref 98–111)
CHOLESTEROL, TOTAL: 179 MG/DL (ref 100–199)
CO2: 25 MEQ/L (ref 23–33)
CREAT SERPL-MCNC: 0.7 MG/DL (ref 0.4–1.2)
GFR SERPL CREATININE-BSD FRML MDRD: > 90 ML/MIN/1.73M2
GLUCOSE BLD-MCNC: 118 MG/DL (ref 70–108)
GLUCOSE BLD-MCNC: 122 MG/DL (ref 70–108)
GLUCOSE BLD-MCNC: 131 MG/DL (ref 70–108)
GLUCOSE BLD-MCNC: 154 MG/DL (ref 70–108)
HDLC SERPL-MCNC: 62 MG/DL
LDL CHOLESTEROL CALCULATED: 98 MG/DL
MAGNESIUM: 2.3 MG/DL (ref 1.6–2.4)
PHOSPHORUS: 3.6 MG/DL (ref 2.4–4.7)
POTASSIUM SERPL-SCNC: 4.5 MEQ/L (ref 3.5–5.2)
PREGNANCY, URINE: NEGATIVE
SODIUM BLD-SCNC: 141 MEQ/L (ref 135–145)
TOTAL PROTEIN: 7.1 G/DL (ref 6.1–8)
TRIGL SERPL-MCNC: 93 MG/DL (ref 0–199)

## 2018-09-21 PROCEDURE — 6360000002 HC RX W HCPCS: Performed by: INTERNAL MEDICINE

## 2018-09-21 PROCEDURE — 6370000000 HC RX 637 (ALT 250 FOR IP): Performed by: FAMILY MEDICINE

## 2018-09-21 PROCEDURE — 2700000000 HC OXYGEN THERAPY PER DAY

## 2018-09-21 PROCEDURE — 6370000000 HC RX 637 (ALT 250 FOR IP): Performed by: INTERNAL MEDICINE

## 2018-09-21 PROCEDURE — 6370000000 HC RX 637 (ALT 250 FOR IP): Performed by: NURSE PRACTITIONER

## 2018-09-21 PROCEDURE — 1200000003 HC TELEMETRY R&B

## 2018-09-21 PROCEDURE — 99232 SBSQ HOSP IP/OBS MODERATE 35: CPT | Performed by: INTERNAL MEDICINE

## 2018-09-21 PROCEDURE — 94761 N-INVAS EAR/PLS OXIMETRY MLT: CPT

## 2018-09-21 PROCEDURE — 87205 SMEAR GRAM STAIN: CPT

## 2018-09-21 PROCEDURE — 36415 COLL VENOUS BLD VENIPUNCTURE: CPT

## 2018-09-21 PROCEDURE — 87186 SC STD MICRODIL/AGAR DIL: CPT

## 2018-09-21 PROCEDURE — 94640 AIRWAY INHALATION TREATMENT: CPT

## 2018-09-21 PROCEDURE — APPSS45 APP SPLIT SHARED TIME 31-45 MINUTES: Performed by: NURSE PRACTITIONER

## 2018-09-21 PROCEDURE — 84100 ASSAY OF PHOSPHORUS: CPT

## 2018-09-21 PROCEDURE — 87077 CULTURE AEROBIC IDENTIFY: CPT

## 2018-09-21 PROCEDURE — 80061 LIPID PANEL: CPT

## 2018-09-21 PROCEDURE — 2580000003 HC RX 258: Performed by: INTERNAL MEDICINE

## 2018-09-21 PROCEDURE — 83735 ASSAY OF MAGNESIUM: CPT

## 2018-09-21 PROCEDURE — 87147 CULTURE TYPE IMMUNOLOGIC: CPT

## 2018-09-21 PROCEDURE — 82948 REAGENT STRIP/BLOOD GLUCOSE: CPT

## 2018-09-21 PROCEDURE — 70486 CT MAXILLOFACIAL W/O DYE: CPT

## 2018-09-21 PROCEDURE — 99231 SBSQ HOSP IP/OBS SF/LOW 25: CPT | Performed by: NURSE PRACTITIONER

## 2018-09-21 PROCEDURE — 80053 COMPREHEN METABOLIC PANEL: CPT

## 2018-09-21 PROCEDURE — 87070 CULTURE OTHR SPECIMN AEROBIC: CPT

## 2018-09-21 PROCEDURE — 81025 URINE PREGNANCY TEST: CPT

## 2018-09-21 PROCEDURE — 99233 SBSQ HOSP IP/OBS HIGH 50: CPT | Performed by: INTERNAL MEDICINE

## 2018-09-21 PROCEDURE — 87184 SC STD DISK METHOD PER PLATE: CPT

## 2018-09-21 RX ORDER — PANTOPRAZOLE SODIUM 40 MG/1
40 TABLET, DELAYED RELEASE ORAL
Status: DISCONTINUED | OUTPATIENT
Start: 2018-09-21 | End: 2018-09-26 | Stop reason: HOSPADM

## 2018-09-21 RX ORDER — PROMETHAZINE HYDROCHLORIDE AND CODEINE PHOSPHATE 6.25; 1 MG/5ML; MG/5ML
5 SYRUP ORAL EVERY 4 HOURS PRN
Status: DISCONTINUED | OUTPATIENT
Start: 2018-09-21 | End: 2018-09-26 | Stop reason: HOSPADM

## 2018-09-21 RX ORDER — MONTELUKAST SODIUM 10 MG/1
10 TABLET ORAL NIGHTLY
Status: DISCONTINUED | OUTPATIENT
Start: 2018-09-21 | End: 2018-09-26 | Stop reason: HOSPADM

## 2018-09-21 RX ORDER — LEVOFLOXACIN 500 MG/1
500 TABLET, FILM COATED ORAL DAILY
Status: DISCONTINUED | OUTPATIENT
Start: 2018-09-21 | End: 2018-09-22

## 2018-09-21 RX ORDER — DIPHENHYDRAMINE HCL 25 MG
50 TABLET ORAL NIGHTLY
Status: DISCONTINUED | OUTPATIENT
Start: 2018-09-21 | End: 2018-09-26 | Stop reason: HOSPADM

## 2018-09-21 RX ORDER — BENZONATATE 100 MG/1
200 CAPSULE ORAL 3 TIMES DAILY PRN
Status: DISCONTINUED | OUTPATIENT
Start: 2018-09-21 | End: 2018-09-26 | Stop reason: HOSPADM

## 2018-09-21 RX ORDER — CETIRIZINE HYDROCHLORIDE 10 MG/1
10 TABLET ORAL DAILY
Status: DISCONTINUED | OUTPATIENT
Start: 2018-09-21 | End: 2018-09-26 | Stop reason: HOSPADM

## 2018-09-21 RX ADMIN — BENZONATATE 200 MG: 100 CAPSULE ORAL at 10:44

## 2018-09-21 RX ADMIN — CETIRIZINE HYDROCHLORIDE 10 MG: 10 TABLET, FILM COATED ORAL at 10:44

## 2018-09-21 RX ADMIN — IPRATROPIUM BROMIDE AND ALBUTEROL SULFATE 1 AMPULE: .5; 3 SOLUTION RESPIRATORY (INHALATION) at 07:33

## 2018-09-21 RX ADMIN — METHYLPREDNISOLONE SODIUM SUCCINATE 40 MG: 40 INJECTION, POWDER, FOR SOLUTION INTRAMUSCULAR; INTRAVENOUS at 17:22

## 2018-09-21 RX ADMIN — DILTIAZEM HYDROCHLORIDE 30 MG: 30 TABLET, FILM COATED ORAL at 17:21

## 2018-09-21 RX ADMIN — Medication 10 ML: at 09:36

## 2018-09-21 RX ADMIN — DILTIAZEM HYDROCHLORIDE 30 MG: 30 TABLET, FILM COATED ORAL at 23:37

## 2018-09-21 RX ADMIN — MAGNESIUM HYDROXIDE 30 ML: 400 SUSPENSION ORAL at 19:34

## 2018-09-21 RX ADMIN — IPRATROPIUM BROMIDE AND ALBUTEROL SULFATE 1 AMPULE: .5; 3 SOLUTION RESPIRATORY (INHALATION) at 15:55

## 2018-09-21 RX ADMIN — DILTIAZEM HYDROCHLORIDE 30 MG: 30 TABLET, FILM COATED ORAL at 12:07

## 2018-09-21 RX ADMIN — LEVOFLOXACIN 500 MG: 500 TABLET, FILM COATED ORAL at 17:25

## 2018-09-21 RX ADMIN — DILTIAZEM HYDROCHLORIDE 30 MG: 30 TABLET, FILM COATED ORAL at 00:02

## 2018-09-21 RX ADMIN — ACETAMINOPHEN 650 MG: 325 TABLET ORAL at 03:05

## 2018-09-21 RX ADMIN — ENOXAPARIN SODIUM 40 MG: 40 INJECTION SUBCUTANEOUS at 09:39

## 2018-09-21 RX ADMIN — BENZONATATE 200 MG: 100 CAPSULE ORAL at 21:18

## 2018-09-21 RX ADMIN — GUAIFENESIN 600 MG: 600 TABLET, EXTENDED RELEASE ORAL at 09:35

## 2018-09-21 RX ADMIN — BUSPIRONE HYDROCHLORIDE 10 MG: 10 TABLET ORAL at 21:04

## 2018-09-21 RX ADMIN — ACETAMINOPHEN 650 MG: 325 TABLET ORAL at 21:14

## 2018-09-21 RX ADMIN — ENOXAPARIN SODIUM 40 MG: 40 INJECTION SUBCUTANEOUS at 21:12

## 2018-09-21 RX ADMIN — ASPIRIN 81 MG: 81 TABLET, COATED ORAL at 09:39

## 2018-09-21 RX ADMIN — IPRATROPIUM BROMIDE AND ALBUTEROL SULFATE 1 AMPULE: .5; 3 SOLUTION RESPIRATORY (INHALATION) at 19:50

## 2018-09-21 RX ADMIN — DIPHENHYDRAMINE HCL 50 MG: 25 TABLET ORAL at 21:14

## 2018-09-21 RX ADMIN — METHYLPREDNISOLONE SODIUM SUCCINATE 40 MG: 40 INJECTION, POWDER, FOR SOLUTION INTRAMUSCULAR; INTRAVENOUS at 03:06

## 2018-09-21 RX ADMIN — MONTELUKAST SODIUM 10 MG: 10 TABLET, FILM COATED ORAL at 21:07

## 2018-09-21 RX ADMIN — GUAIFENESIN 600 MG: 600 TABLET, EXTENDED RELEASE ORAL at 21:07

## 2018-09-21 RX ADMIN — ACETAMINOPHEN 650 MG: 325 TABLET ORAL at 09:41

## 2018-09-21 RX ADMIN — Medication 10 ML: at 21:05

## 2018-09-21 RX ADMIN — ALBUTEROL SULFATE 2.5 MG: 2.5 SOLUTION RESPIRATORY (INHALATION) at 02:51

## 2018-09-21 RX ADMIN — INSULIN LISPRO 1 UNITS: 100 INJECTION, SOLUTION INTRAVENOUS; SUBCUTANEOUS at 21:11

## 2018-09-21 RX ADMIN — DILTIAZEM HYDROCHLORIDE 30 MG: 30 TABLET, FILM COATED ORAL at 06:11

## 2018-09-21 RX ADMIN — IPRATROPIUM BROMIDE AND ALBUTEROL SULFATE 1 AMPULE: .5; 3 SOLUTION RESPIRATORY (INHALATION) at 12:59

## 2018-09-21 RX ADMIN — PANTOPRAZOLE SODIUM 40 MG: 40 TABLET, DELAYED RELEASE ORAL at 10:46

## 2018-09-21 RX ADMIN — BUSPIRONE HYDROCHLORIDE 10 MG: 10 TABLET ORAL at 09:35

## 2018-09-21 RX ADMIN — BUSPIRONE HYDROCHLORIDE 10 MG: 10 TABLET ORAL at 13:28

## 2018-09-21 ASSESSMENT — PAIN SCALES - GENERAL
PAINLEVEL_OUTOF10: 7
PAINLEVEL_OUTOF10: 4
PAINLEVEL_OUTOF10: 0
PAINLEVEL_OUTOF10: 3
PAINLEVEL_OUTOF10: 0
PAINLEVEL_OUTOF10: 0
PAINLEVEL_OUTOF10: 3
PAINLEVEL_OUTOF10: 2

## 2018-09-21 ASSESSMENT — PAIN DESCRIPTION - PAIN TYPE: TYPE: ACUTE PAIN

## 2018-09-21 ASSESSMENT — PAIN DESCRIPTION - DESCRIPTORS: DESCRIPTORS: HEADACHE

## 2018-09-21 ASSESSMENT — PAIN DESCRIPTION - LOCATION
LOCATION: HEAD
LOCATION: HEAD

## 2018-09-21 NOTE — PLAN OF CARE
Problem: Impaired respiratory status  Goal: Clear lung sounds  Outcome: Ongoing  Duoneb to improve breath sounds.

## 2018-09-21 NOTE — PLAN OF CARE
Problem: Falls - Risk of:  Goal: Will remain free from falls  Will remain free from falls   Outcome: Ongoing  No falls noted this shift. Continue falling star program. Bed alarm on, bed in low position. Call light and personal belongings in reach. Patient uses call light appropriately. Problem: Pain:  Goal: Pain level will decrease  Pain level will decrease   Outcome: Ongoing  Pt complaining of pain this shift. Prn pain medication available. Will monitor. Problem: Discharge Planning:  Goal: Discharged to appropriate level of care  Discharged to appropriate level of care   Outcome: Ongoing  Pt plans to return home at discharge. Will continue to assess discharge needs. Problem: Anxiety/Stress:  Goal: Level of anxiety will decrease  Level of anxiety will decrease   Outcome: Ongoing  Pt states some anxiety this shift. Pt states Buspar helps. Will monitor. Problem: Cardiac Output - Decreased:  Goal: Hemodynamic stability will improve  Hemodynamic stability will improve   Outcome: Ongoing  Vitals:    09/20/18 2017   BP: 134/77   Pulse: 117   Resp: 16   Temp: 97.7 °F (36.5 °C)   SpO2: 92%         Problem: Gas Exchange - Impaired:  Goal: Levels of oxygenation will improve  Levels of oxygenation will improve   Outcome: Ongoing  O2 >90% on 2L NC this shift. Will monitor. Comments: Care plan reviewed with patient. Patient verbalizes understanding of plan of care and contributes to goal setting.

## 2018-09-21 NOTE — FLOWSHEET NOTE
09/21/18 1003   Encounter Summary   Services provided to: Patient   Referral/Consult From: Danielle   Continue Visiting Yes  (9/21/18)   Complexity of Encounter Moderate   Length of Encounter 15 minutes   Routine   Type Initial   Assessment Calm; Approachable   Intervention Active listening;Nurtured hope   Outcome Acceptance;Comfort;Expressed gratitude   Spiritual/Anabaptism   Type Spiritual support   S- During my contact with the patient I wanted to assess his spiritual and emotional        needs. Following up on a volunteer 's visit. O- The pt was in bed and stated that they had \"a bad night last night. \" When asked why it was mainly due to           the uncertainties of their health. The pt also shared that they receive support from their spouse, children            and from [address] [place] [address]. No family was present at the time. A-  I offered emotional and spiritual support to the pt. P-  Continued support would be helpful in order to meet the future Spiritual needs of the         patient.

## 2018-09-21 NOTE — PROGRESS NOTES
Hospital Medicine Progress Note     Date:  9/21/2018    PCP: No primary care provider on file. (Tel: None)    Date of Admission: 9/19/2018    Chief complaint:   Chief Complaint   Patient presents with    Shortness of Breath    Wheezing    Cough       Brief hospital course: 43F with hx of heavy tobacco use (1-1.5 PPD for about 9 years, quit for a couple of years, picked back up and finally quit close to 2 years ago) who presents to ER with complaints of nonproductive cough without fever that had been present for a couple of days leading to presentation. She has had runny nose and sneezing, tried OTC cough meds which did not improve her symptoms. Assessment:  Principal Problem:    Acute asthma exacerbation  Active Problems:    Acute respiratory failure with hypercapnia (HCC)    Leukocytosis    Sinus tachycardia    Asthmatic bronchitis    Viral upper respiratory tract infection    SIRS (systemic inflammatory response syndrome) (HCC)    Psoriasis    Anxiety disorder    Morbid obesity with BMI of 45.0-49.9, adult ([facility] [address]    History of asthma  Resolved Problems:    Hyponatremia        Plan:  1. Acute asthma exacerbation. Likely triggered by URI. Continue steroid and breathing treatments. Added singulair. CT-facial bones, no sinusitis. 2. Acute bronchitis. Respiratory disease panel pending. Influenza screen negative. Continue mucinex as ordered. Added phenergan-codeine and tessalon for cough. 3. Acute respiratory failure with hypercapnea. Secondary to underlying bronchitis and asthma exacerbation. On supplemental oxygen; wean as tolerated. Continue breathing rx. 4. Sinus tachycardia. Suspect secondary to underlying asthma and breathing rx. CTA-chest negative for PE. TSH normal. Continue PO cardizem. 5. Leukocytosis. WBC 11 on presentation, up to 30s secondary to steroid. Pro-calcitonin level is low. Will monitor off antibiotics. 6. Chest pain.  Slightly detectable troponin, 0.018>0.013>nondetectable level. Suspect secondary to tachycardia and cough. Cardio on board. Stress test in AM. On aspirin. Diet: DIET GENERAL; No Added Salt (3-4 GM)  Diet NPO, After Midnight    Code status: Full Code     ----------  Subjective  Did not sleep much overnight due to cough. Reports still coughing a lot, shortness of breath. Objective  Physical exam:  Vitals: /76   Pulse 113   Temp 98.9 °F (37.2 °C) (Oral)   Resp 18   Ht 5' 9\" (1.753 m)   Wt (!) 312 lb 9.6 oz (141.8 kg)   SpO2 92%   BMI 46.16 kg/m²   Gen/overall appearance: Not in acute distress. Alert. Oriented X3  Head: Normocephalic, atraumatic  Eyes: EOMI, good acuity  ENT: Oral mucosa moist  Neck: No JVD, thyromegaly  CVS: Nml S1S2, no MRG. Tachycardic  Pulm: Expiratory wheezes present but improved. .  Gastrointestinal: Soft, NT/ND, +BS  Musculoskeletal: No edema. Warm  Neuro: No focal deficit. Moves extremity spontaneously. Psychiatry: Appropriate affect. Not agitated. Skin: Warm, dry with normal turgor. No rash  Capillary refill: Brisk,< 3 seconds   Peripheral Pulses: +2 palpable, equal bilaterally       24HR INTAKE/OUTPUT:      Intake/Output Summary (Last 24 hours) at 09/21/18 0800  Last data filed at 09/21/18 0310   Gross per 24 hour   Intake             1680 ml   Output             2700 ml   Net            -1020 ml     I/O last 3 completed shifts: In: 1680 [P.O.:1680]  Out: 2700 [Urine:2700]  No intake/output data recorded.       Meds:    enoxaparin  40 mg Subcutaneous BID    diltiazem  30 mg Oral 4 times per day    aspirin  81 mg Oral Daily    famotidine  20 mg Oral BID    methylPREDNISolone  40 mg Intravenous Q12H    insulin lispro  0-6 Units Subcutaneous TID WC    insulin lispro  0-3 Units Subcutaneous Nightly    sodium chloride flush  10 mL Intravenous 2 times per day    ipratropium-albuterol  1 ampule Inhalation Q4H WA    guaiFENesin  600 mg Oral BID    busPIRone  10 mg Oral TID       Infusions:    dextrose           PRN Meds: albuterol sulfate HFA, albuterol, hydrOXYzine, calcium carbonate, glucose, dextrose, glucagon (rDNA), dextrose, sodium chloride flush, magnesium hydroxide, ondansetron, potassium chloride **OR** potassium chloride **OR** potassium chloride, potassium chloride, magnesium sulfate, acetaminophen    Labs/imaging:  CBC:   Recent Labs      09/19/18   0600  09/20/18   0503   WBC  11.7*  32.1*   HGB  15.4  14.6   PLT  312  356         BMP:    Recent Labs      09/19/18   0600  09/20/18   0503  09/21/18   0540   NA  134*  138  141   K  4.1  4.9  4.5   CL  99  103  103   CO2  24  25  25   BUN  13  11  18   CREATININE  0.7  0.7  0.7   GLUCOSE  121*  153*  131*         Hepatic:   Recent Labs      09/21/18   0540   AST  26   ALT  28   BILITOT  0.2*   ALKPHOS  67         Reshma Nolasco MD  -------------------------------  Rounding hospitalist

## 2018-09-21 NOTE — CARE COORDINATION
9/21/18 11:15 AM     Cardiology consulted, planning stress test in the morning. Spoke with Zahira, she would like established with Andressa Bhardwaj at discharge.   Appts made and added to AVS.

## 2018-09-21 NOTE — PROGRESS NOTES
0540   NA  134*  138   --   141   K  4.1  4.9   --   4.5   CL  99  103   --   103   CO2  24  25   --   25   BUN  13  11   --   18   CREATININE  0.7  0.7   --   0.7   GLUCOSE  121*  153*   --   131*   MG   --    --   2.2  2.3   PHOS   --    --   2.5  3.6   CALCIUM  9.1  9.3   --   9.3     LFT  Recent Labs      09/21/18   0540   AST  26   ALT  28   BILITOT  0.2*   ALKPHOS  67     TROP  Lab Results   Component Value Date    TROPONINT < 0.010 09/20/2018    TROPONINT 0.013 09/19/2018    TROPONINT 0.018 09/19/2018     BNP  No results for input(s): BNP in the last 72 hours. Lactic Acid  Recent Labs      09/19/18   0600   LACTA  0.9     INR  No results for input(s): INR, PROTIME in the last 72 hours. PTT  No results for input(s): APTT in the last 72 hours. Glucose  Recent Labs      09/20/18 2022 09/21/18   1205   POCGLU  155*  118*     UA   Recent Labs      09/19/18   1720   SPECGRAV  1.018   PHUR  5.5   COLORU  YELLOW   PROTEINU  NEGATIVE   BLOODU  NEGATIVE   RBCUA  0-2   WBCUA  0-2   BACTERIA  NONE   NITRU  NEGATIVE   BILIRUBINUR  NEGATIVE   UROBILINOGEN  0.2   KETUA  NEGATIVE   LABCAST  NONE SEEN  NONE SEEN   . PFTs   No old studies in Epic. Sleep studies   No old studies in Epic. Cultures    Blood cultures X2 sets (-) to date  Rapid swab for influenza A and B (-)    EKG     Echocardiogram   9/20/2018  Transthoracic Echocardiography Report (TTE)  Right Ventricle   The right ventricular size was normal with normal systolic function and   wall thickness. Conclusions      Summary   Normal left ventricle size and systolic function. Ejection fraction was   estimated at 60 to 65 %. There were no regional left ventricular wall   motion abnormalities and wall thickness was within normal limits. Signature      ----------------------------------------------------------------   Electronically signed by Kenisha Bell MD (Interpreting      Radiology    CXR  Sep 19, 2018  Narrative PROCEDURE: XR CHEST (2 VW)  1. She quit smoking 1year and 8 months back and has no second hand exposure. Abnormal CT of sinuses  Seasonal/Environmental Allergies on Zyrtec  GERD on PPI  Elevated troponin level: Cardiology following with plans for a stress test tomorrow  Anxiety disorder  Morbid obesity with BMI 46  Full Code    Plan   -Continue Solumedrol/DuoNebs/Mucinex/IS/Acapella  -Add Levaquin 500 mg daily  -Continue to follow cultures  -ENT consult  -Wean Oxygen to keep Spo2 >90%. -Home 02 evaluation at discharge  -Will need out patient full PFTs at the time of discharge with possible allergy work up  -DVT prophylaxis with Lovenox     Case discussed with patient and nurse. Questions and concerns addressed. Electronically signed by   VIOLA Tyler CNP on 9/21/2018 at 2:06 PM     Addendum by Dr. Anika Cheung MD:  I have seen and examined the patient independently. Face to face evaluation and examination was performed. The above evaluation and note has been reviewed. Labs and radiographs were reviewed. I Have discussed with Ms. Mary Wu. EBONY Valenzuela about this patient in detail. The above assessment and plan has been reviewed. Please see my modifications mentioned below. My modifications:  Still wheezing. She denies any hx of pregnancy. Will start on antibiotics for Acute bronchitis due to no clinical improvement and worsening of cough and sputum.     Anabel Monreal MD 9/21/2018 3:55 PM

## 2018-09-21 NOTE — PROGRESS NOTES
PRN   magnesium hydroxide 30 mL Daily PRN   ondansetron 4 mg Q6H PRN   potassium chloride 40 mEq PRN   Or     potassium chloride 40 mEq PRN   Or     potassium chloride 10 mEq PRN   potassium chloride 10 mEq PRN   magnesium sulfate 1 g PRN   acetaminophen 650 mg Q4H PRN       Diagnostics:  EK18  Sinus Tachycardia, no acute abnormalities    Echo: 18  Summary   Normal left ventricle size and systolic function. Ejection fraction was   estimated at 60 to 65 %. There were no regional left ventricular wall   motion abnormalities and wall thickness was within normal limits.      Signature      ----------------------------------------------------------------   Electronically signed by Rhys Reid MD     EF: 60-65%        Lab Data:    Cardiac Enzymes:  No results for input(s): CKTOTAL, CKMB, CKMBINDEX, TROPONINI in the last 72 hours.     CBC:   Lab Results   Component Value Date    WBC 32.1 2018    RBC 4.90 2018    RBC 4.86 2012    HGB 14.6 2018    HCT 43.9 2018     2018       CMP:  Lab Results   Component Value Date     2018    K 4.5 2018     2018    CO2 25 2018    BUN 18 2018    CREATININE 0.7 2018    LABGLOM >90 2018    GLUCOSE 131 2018    GLUCOSE 87 2011    CALCIUM 9.3 2018       Hepatic Function Panel:  Lab Results   Component Value Date    ALKPHOS 67 2018    ALT 28 2018    AST 26 2018    PROT 7.1 2018    BILITOT 0.2 2018    BILIDIR <0.2 2015    LABALBU 3.9 2018    LABALBU 4.3 2012       Magnesium:    Lab Results   Component Value Date    MG 2.3 2018       PT/INR:  No results found for: PROTIME, INR    HgBA1c:    Lab Results   Component Value Date    LABA1C 5.0 2018       FLP:  Lab Results   Component Value Date    TRIG 93 2018    HDL 62 2018    LDLCALC 98 2018       TSH:    Lab Results   Component Value Date    TSH

## 2018-09-22 ENCOUNTER — APPOINTMENT (OUTPATIENT)
Dept: NON INVASIVE DIAGNOSTICS | Age: 43
DRG: 177 | End: 2018-09-22

## 2018-09-22 ENCOUNTER — APPOINTMENT (OUTPATIENT)
Dept: GENERAL RADIOLOGY | Age: 43
DRG: 177 | End: 2018-09-22

## 2018-09-22 LAB
ALBUMIN SERPL-MCNC: 3.8 G/DL (ref 3.5–5.1)
ALP BLD-CCNC: 67 U/L (ref 38–126)
ALT SERPL-CCNC: 28 U/L (ref 11–66)
ANION GAP SERPL CALCULATED.3IONS-SCNC: 11 MEQ/L (ref 8–16)
AST SERPL-CCNC: 20 U/L (ref 5–40)
BASOPHILS # BLD: 0 %
BASOPHILS ABSOLUTE: 0 THOU/MM3 (ref 0–0.1)
BILIRUB SERPL-MCNC: 0.2 MG/DL (ref 0.3–1.2)
BUN BLDV-MCNC: 16 MG/DL (ref 7–22)
CALCIUM SERPL-MCNC: 8.8 MG/DL (ref 8.5–10.5)
CHLORIDE BLD-SCNC: 102 MEQ/L (ref 98–111)
CO2: 26 MEQ/L (ref 23–33)
CREAT SERPL-MCNC: 0.7 MG/DL (ref 0.4–1.2)
EOSINOPHIL # BLD: 0 %
EOSINOPHILS ABSOLUTE: 0 THOU/MM3 (ref 0–0.4)
ERYTHROCYTE [DISTWIDTH] IN BLOOD BY AUTOMATED COUNT: 13.4 % (ref 11.5–14.5)
ERYTHROCYTE [DISTWIDTH] IN BLOOD BY AUTOMATED COUNT: 44.8 FL (ref 35–45)
GFR SERPL CREATININE-BSD FRML MDRD: > 90 ML/MIN/1.73M2
GLUCOSE BLD-MCNC: 108 MG/DL (ref 70–108)
GLUCOSE BLD-MCNC: 121 MG/DL (ref 70–108)
GLUCOSE BLD-MCNC: 128 MG/DL (ref 70–108)
GLUCOSE BLD-MCNC: 143 MG/DL (ref 70–108)
GLUCOSE BLD-MCNC: 144 MG/DL (ref 70–108)
GLUCOSE BLD-MCNC: 200 MG/DL (ref 70–108)
HCT VFR BLD CALC: 48.2 % (ref 37–47)
HEMOGLOBIN: 15.5 GM/DL (ref 12–16)
IMMATURE GRANS (ABS): 0.85 THOU/MM3 (ref 0–0.07)
LYMPHOCYTES # BLD: 7 %
LYMPHOCYTES ABSOLUTE: 1.6 THOU/MM3 (ref 1–4.8)
MAGNESIUM: 2.6 MG/DL (ref 1.6–2.4)
MCH RBC QN AUTO: 29.4 PG (ref 26–33)
MCHC RBC AUTO-ENTMCNC: 32.2 GM/DL (ref 32.2–35.5)
MCV RBC AUTO: 91.3 FL (ref 81–99)
METAMYELOCYTES: 4 %
MONOCYTES # BLD: 3 %
MONOCYTES ABSOLUTE: 0.7 THOU/MM3 (ref 0.4–1.3)
NUCLEATED RED BLOOD CELLS: 0 /100 WBC
PHOSPHORUS: 4.1 MG/DL (ref 2.4–4.7)
PLATELET # BLD: 472 THOU/MM3 (ref 130–400)
PLATELET ESTIMATE: ADEQUATE
PMV BLD AUTO: 9 FL (ref 9.4–12.4)
POTASSIUM SERPL-SCNC: 4.5 MEQ/L (ref 3.5–5.2)
RBC # BLD: 5.28 MILL/MM3 (ref 4.2–5.4)
SCAN OF BLOOD SMEAR: NORMAL
SEG NEUTROPHILS: 86 %
SEGMENTED NEUTROPHILS ABSOLUTE COUNT: 19.3 THOU/MM3 (ref 1.8–7.7)
SODIUM BLD-SCNC: 139 MEQ/L (ref 135–145)
TOTAL PROTEIN: 6.8 G/DL (ref 6.1–8)
WBC # BLD: 22.4 THOU/MM3 (ref 4.8–10.8)

## 2018-09-22 PROCEDURE — 94640 AIRWAY INHALATION TREATMENT: CPT

## 2018-09-22 PROCEDURE — 99232 SBSQ HOSP IP/OBS MODERATE 35: CPT | Performed by: INTERNAL MEDICINE

## 2018-09-22 PROCEDURE — 6360000002 HC RX W HCPCS: Performed by: INTERNAL MEDICINE

## 2018-09-22 PROCEDURE — 99233 SBSQ HOSP IP/OBS HIGH 50: CPT | Performed by: INTERNAL MEDICINE

## 2018-09-22 PROCEDURE — 6370000000 HC RX 637 (ALT 250 FOR IP): Performed by: NURSE PRACTITIONER

## 2018-09-22 PROCEDURE — 6370000000 HC RX 637 (ALT 250 FOR IP): Performed by: FAMILY MEDICINE

## 2018-09-22 PROCEDURE — 6370000000 HC RX 637 (ALT 250 FOR IP): Performed by: INTERNAL MEDICINE

## 2018-09-22 PROCEDURE — 80053 COMPREHEN METABOLIC PANEL: CPT

## 2018-09-22 PROCEDURE — 1200000003 HC TELEMETRY R&B

## 2018-09-22 PROCEDURE — 2700000000 HC OXYGEN THERAPY PER DAY

## 2018-09-22 PROCEDURE — 2580000003 HC RX 258: Performed by: INTERNAL MEDICINE

## 2018-09-22 PROCEDURE — 99231 SBSQ HOSP IP/OBS SF/LOW 25: CPT | Performed by: NURSE PRACTITIONER

## 2018-09-22 PROCEDURE — 93017 CV STRESS TEST TRACING ONLY: CPT | Performed by: INTERNAL MEDICINE

## 2018-09-22 PROCEDURE — 71046 X-RAY EXAM CHEST 2 VIEWS: CPT

## 2018-09-22 PROCEDURE — 84100 ASSAY OF PHOSPHORUS: CPT

## 2018-09-22 PROCEDURE — 83735 ASSAY OF MAGNESIUM: CPT

## 2018-09-22 PROCEDURE — 2709999900 HC NON-CHARGEABLE SUPPLY

## 2018-09-22 PROCEDURE — A9500 TC99M SESTAMIBI: HCPCS | Performed by: INTERNAL MEDICINE

## 2018-09-22 PROCEDURE — 82948 REAGENT STRIP/BLOOD GLUCOSE: CPT

## 2018-09-22 PROCEDURE — 6360000002 HC RX W HCPCS

## 2018-09-22 PROCEDURE — 85025 COMPLETE CBC W/AUTO DIFF WBC: CPT

## 2018-09-22 PROCEDURE — APPSS30 APP SPLIT SHARED TIME 16-30 MINUTES: Performed by: NURSE PRACTITIONER

## 2018-09-22 PROCEDURE — 78452 HT MUSCLE IMAGE SPECT MULT: CPT

## 2018-09-22 PROCEDURE — 3430000000 HC RX DIAGNOSTIC RADIOPHARMACEUTICAL: Performed by: INTERNAL MEDICINE

## 2018-09-22 PROCEDURE — 36415 COLL VENOUS BLD VENIPUNCTURE: CPT

## 2018-09-22 RX ORDER — DILTIAZEM HYDROCHLORIDE 60 MG/1
60 TABLET, FILM COATED ORAL EVERY 6 HOURS SCHEDULED
Status: DISCONTINUED | OUTPATIENT
Start: 2018-09-22 | End: 2018-09-26 | Stop reason: HOSPADM

## 2018-09-22 RX ORDER — DOXYCYCLINE HYCLATE 100 MG
100 TABLET ORAL EVERY 12 HOURS SCHEDULED
Status: DISCONTINUED | OUTPATIENT
Start: 2018-09-22 | End: 2018-09-23

## 2018-09-22 RX ADMIN — INSULIN LISPRO 1 UNITS: 100 INJECTION, SOLUTION INTRAVENOUS; SUBCUTANEOUS at 21:44

## 2018-09-22 RX ADMIN — IPRATROPIUM BROMIDE AND ALBUTEROL SULFATE 1 AMPULE: .5; 3 SOLUTION RESPIRATORY (INHALATION) at 01:56

## 2018-09-22 RX ADMIN — IPRATROPIUM BROMIDE AND ALBUTEROL SULFATE 1 AMPULE: .5; 3 SOLUTION RESPIRATORY (INHALATION) at 17:14

## 2018-09-22 RX ADMIN — GUAIFENESIN 600 MG: 600 TABLET, EXTENDED RELEASE ORAL at 21:44

## 2018-09-22 RX ADMIN — PANTOPRAZOLE SODIUM 40 MG: 40 TABLET, DELAYED RELEASE ORAL at 07:51

## 2018-09-22 RX ADMIN — BUSPIRONE HYDROCHLORIDE 10 MG: 10 TABLET ORAL at 07:51

## 2018-09-22 RX ADMIN — DILTIAZEM HYDROCHLORIDE 60 MG: 60 TABLET, FILM COATED ORAL at 17:34

## 2018-09-22 RX ADMIN — MAGNESIUM HYDROXIDE 30 ML: 400 SUSPENSION ORAL at 17:34

## 2018-09-22 RX ADMIN — IPRATROPIUM BROMIDE AND ALBUTEROL SULFATE 1 AMPULE: .5; 3 SOLUTION RESPIRATORY (INHALATION) at 21:21

## 2018-09-22 RX ADMIN — DILTIAZEM HYDROCHLORIDE 60 MG: 60 TABLET, FILM COATED ORAL at 23:25

## 2018-09-22 RX ADMIN — ACETAMINOPHEN 650 MG: 325 TABLET ORAL at 10:07

## 2018-09-22 RX ADMIN — Medication 10.3 MILLICURIE: at 08:05

## 2018-09-22 RX ADMIN — DILTIAZEM HYDROCHLORIDE 30 MG: 30 TABLET, FILM COATED ORAL at 12:03

## 2018-09-22 RX ADMIN — GUAIFENESIN 600 MG: 600 TABLET, EXTENDED RELEASE ORAL at 07:52

## 2018-09-22 RX ADMIN — Medication 10 ML: at 23:20

## 2018-09-22 RX ADMIN — Medication 10 ML: at 04:17

## 2018-09-22 RX ADMIN — IPRATROPIUM BROMIDE AND ALBUTEROL SULFATE 1 AMPULE: .5; 3 SOLUTION RESPIRATORY (INHALATION) at 07:31

## 2018-09-22 RX ADMIN — ENOXAPARIN SODIUM 40 MG: 40 INJECTION SUBCUTANEOUS at 07:56

## 2018-09-22 RX ADMIN — CETIRIZINE HYDROCHLORIDE 10 MG: 10 TABLET, FILM COATED ORAL at 07:52

## 2018-09-22 RX ADMIN — DILTIAZEM HYDROCHLORIDE 30 MG: 30 TABLET, FILM COATED ORAL at 14:08

## 2018-09-22 RX ADMIN — Medication 10 ML: at 07:56

## 2018-09-22 RX ADMIN — BENZONATATE 200 MG: 100 CAPSULE ORAL at 16:20

## 2018-09-22 RX ADMIN — BUSPIRONE HYDROCHLORIDE 10 MG: 10 TABLET ORAL at 21:44

## 2018-09-22 RX ADMIN — IPRATROPIUM BROMIDE AND ALBUTEROL SULFATE 1 AMPULE: .5; 3 SOLUTION RESPIRATORY (INHALATION) at 12:06

## 2018-09-22 RX ADMIN — METHYLPREDNISOLONE SODIUM SUCCINATE 40 MG: 40 INJECTION, POWDER, FOR SOLUTION INTRAMUSCULAR; INTRAVENOUS at 16:18

## 2018-09-22 RX ADMIN — MONTELUKAST SODIUM 10 MG: 10 TABLET, FILM COATED ORAL at 21:44

## 2018-09-22 RX ADMIN — Medication 2 UNITS: at 12:02

## 2018-09-22 RX ADMIN — Medication 34.3 MILLICURIE: at 09:05

## 2018-09-22 RX ADMIN — ENOXAPARIN SODIUM 40 MG: 40 INJECTION SUBCUTANEOUS at 21:44

## 2018-09-22 RX ADMIN — DOXYCYCLINE HYCLATE 100 MG: 100 TABLET, COATED ORAL at 21:44

## 2018-09-22 RX ADMIN — BUSPIRONE HYDROCHLORIDE 10 MG: 10 TABLET ORAL at 12:03

## 2018-09-22 RX ADMIN — BENZONATATE 200 MG: 100 CAPSULE ORAL at 23:21

## 2018-09-22 RX ADMIN — BENZONATATE 200 MG: 100 CAPSULE ORAL at 07:51

## 2018-09-22 RX ADMIN — DIPHENHYDRAMINE HCL 50 MG: 25 TABLET ORAL at 21:44

## 2018-09-22 RX ADMIN — ACETAMINOPHEN 650 MG: 325 TABLET ORAL at 23:20

## 2018-09-22 RX ADMIN — METHYLPREDNISOLONE SODIUM SUCCINATE 40 MG: 40 INJECTION, POWDER, FOR SOLUTION INTRAMUSCULAR; INTRAVENOUS at 04:16

## 2018-09-22 RX ADMIN — ASPIRIN 81 MG: 81 TABLET, COATED ORAL at 07:51

## 2018-09-22 RX ADMIN — ACETAMINOPHEN 650 MG: 325 TABLET ORAL at 16:22

## 2018-09-22 ASSESSMENT — PAIN SCALES - GENERAL
PAINLEVEL_OUTOF10: 0
PAINLEVEL_OUTOF10: 0
PAINLEVEL_OUTOF10: 4
PAINLEVEL_OUTOF10: 0
PAINLEVEL_OUTOF10: 0

## 2018-09-22 NOTE — PROGRESS NOTES
0540  09/22/18   0506   NA  138   --   141  139   K  4.9   --   4.5  4.5   CL  103   --   103  102   CO2  25   --   25  26   BUN  11   --   18  16   CREATININE  0.7   --   0.7  0.7   GLUCOSE  153*   --   131*  128*   MG   --   2.2  2.3  2.6*   PHOS   --   2.5  3.6  4.1   CALCIUM  9.3   --   9.3  8.8     LFT  Recent Labs      09/21/18   0540  09/22/18   0506   AST  26  20   ALT  28  28   BILITOT  0.2*  0.2*   ALKPHOS  67  67     TROP  Lab Results   Component Value Date    TROPONINT < 0.010 09/20/2018    TROPONINT 0.013 09/19/2018    TROPONINT 0.018 09/19/2018     BNP  No results for input(s): BNP in the last 72 hours. Lactic Acid  No results for input(s): LACTA in the last 72 hours. INR  No results for input(s): INR, PROTIME in the last 72 hours. PTT  No results for input(s): APTT in the last 72 hours. Glucose  Recent Labs      09/22/18   0638  09/22/18   0839  09/22/18   1042   POCGLU  121*  143*  200*     UA   Recent Labs      09/19/18   1720   SPECGRAV  1.018   PHUR  5.5   COLORU  YELLOW   PROTEINU  NEGATIVE   BLOODU  NEGATIVE   RBCUA  0-2   WBCUA  0-2   BACTERIA  NONE   NITRU  NEGATIVE   BILIRUBINUR  NEGATIVE   UROBILINOGEN  0.2   KETUA  NEGATIVE   LABCAST  NONE SEEN  NONE SEEN   . PFTs   No old studies in Epic. Sleep studies   No old studies in Epic. Cultures    Blood cultures X2 sets (-) to date  Rapid swab for influenza A and B (-)  Sputum staph coag +  Urine with contaminate    EKG     Echocardiogram   9/20/2018  Transthoracic Echocardiography Report (TTE)  Right Ventricle   The right ventricular size was normal with normal systolic function and   wall thickness. Conclusions      Summary   Normal left ventricle size and systolic function. Ejection fraction was   estimated at 60 to 65 %. There were no regional left ventricular wall   motion abnormalities and wall thickness was within normal limits.       Signature      ---------------------------------------------------------------- her daughter had URI a week ago)  History of childhood asthma  Possible COPD with a history of tobacco smoking for 20 years: She quit smoking 1year and 8 months back and has no second hand exposure. Abnormal CT of sinuses  Seasonal/Environmental Allergies on Zyrtec  GERD on PPI  Elevated troponin level: Stress test today  Anxiety disorder  Morbid obesity with BMI 46  Full Code    Plan   -Continue Solumedrol/DuoNebs/Mucinex/IS/Acapella  -With staph coag +, Change Levaquin to Doxycycline  -Repeat CXR (she has not made much progress)  -Continue to follow final C&S  -ENT to see  -Await stress test results  -Wean Oxygen to keep Spo2 >90%. -Home 02 evaluation at discharge  -Will need out patient full PFTs at the time of discharge with possible allergy work up  -PT/OT  -DVT prophylaxis with Lovenox     Case discussed with patient and nurse/Dr. Donna Rodrigues. Questions and concerns addressed. Electronically signed by   VIOLA Ewing CNP on 9/22/2018 at 1:11 PM     Addendum by Dr. Donna Rodrigues MD:  I have seen and examined the patient independently. Face to face evaluation and examination was performed. The above evaluation and note has been reviewed. Labs and radiographs were reviewed. I Have discussed with Ms. Chaitanya Phillips. Friesner,CNP about this patient in detail. The above assessment and plan has been reviewed. Please see my modifications mentioned below. My modifications:  Her cough is improving. Still wheezing bilaterally. Urine pregnancy test is Negative.     Trish Sacks, MD 9/22/2018 4:48 PM

## 2018-09-22 NOTE — PROGRESS NOTES
Cardiology Progress Note      Patient:  Lynn Lewis  YOB: 1975  MRN: [id number]   Acct: [de-identified]  Admit Date:  9/19/2018  Primary Cardiologist: Seen by Dr. Latasha Mcdonald    Per Dr. Obie Waters consult note:  REASON FOR CONSULTATION:  Elevated troponin and history of chest pain in a  79-year-old female patient admitted with acute hypoxic respiratory failure  with possible asthma exacerbation or acute wheezes and bronchitis/acute  reactive airway disease. HISTORY OF PRESENT ILLNESS:  This is a 79-year-old  female patient  who presented with a two-day history of worsening of shortness of breath. She has been having wheezes and cough and having significant worsening of  shortness of breath and yesterday it got worse and so the patient finally  decided to come to the emergency room. When she came to the emergency  room, she was having wheezes and she stated she has been having some  rhinorrhea, chills, cough predominantly dry, and at times productive of  whitish sputum. The patient stated that yesterday she had some chest  tightness like 4-5/10 and after breathing treatment and today, she feels  much better. The shortness of breath is a little better today, she was  saturating 83% and needed nasal cannula oxygen, starting from yesterday  from arrival in the emergency room. Denied having fever. No diaphoresis. No nausea, vomiting, or diarrhea. No dysuria, urgency, or frequency, but  complains of palpitation. She took over-the-counter cough medication with  no improvement and finally the patient decided to come to the emergency  room. O2 saturation as I said 83% at room air in the emergency room and  then she was placed on 4 liters of nasal cannula. CT angio, no evidence of  acute pulmonary thromboembolism and no known history of coronary artery  disease. Subjective (Events in last 24 hours): Alert in nad. Denies chest pain, palpitations. Sob improving, on O2 2 L.       Objective:   BP (!) 163/93   Pulse 107   Temp 98 °F (36.7 °C) (Oral)   Resp 24   Ht 5' 9\" (1.753 m)   Wt (!) 309 lb 14.4 oz (140.6 kg)   SpO2 90%   BMI 45.76 kg/m²        TELEMETRY: NSR, rate 90's    Physical Exam:  General Appearance: alert and oriented to person, place and time, in no acute distress  Cardiovascular: normal rate, regular rhythm, normal S1 and S2, no murmurs, rubs, clicks, or gallops, distal pulses intact, no carotid bruits, no JVD  Pulmonary/Chest: soft expiratory wheezes, no rales or rhonchi,  no respiratory distress  Abdomen: soft, non-tender, non-distended, normal bowel sounds, no masses   Extremities: no cyanosis, clubbing or edema, pulses palpable   Skin: warm and dry, no rash or erythema  Head: normocephalic and atraumatic  Eyes: pupils equal, round, and reactive to light  Neck: supple and non-tender without mass, no thyromegaly   Musculoskeletal: normal range of motion, no joint swelling, deformity or tenderness  Neurological: alert, oriented, normal speech, no focal findings or movement disorder noted    Medications:    diltiazem  60 mg Oral 4 times per day    doxycycline hyclate  100 mg Oral 2 times per day    cetirizine  10 mg Oral Daily    diphenhydrAMINE  50 mg Oral Nightly    montelukast  10 mg Oral Nightly    pantoprazole  40 mg Oral QAM AC    enoxaparin  40 mg Subcutaneous BID    aspirin  81 mg Oral Daily    methylPREDNISolone  40 mg Intravenous Q12H    insulin lispro  0-6 Units Subcutaneous TID WC    insulin lispro  0-3 Units Subcutaneous Nightly    sodium chloride flush  10 mL Intravenous 2 times per day    ipratropium-albuterol  1 ampule Inhalation Q4H WA    guaiFENesin  600 mg Oral BID    busPIRone  10 mg Oral TID      dextrose         benzonatate 200 mg TID PRN   promethazine-codeine 5 mL Q4H PRN   albuterol sulfate HFA 2 puff Q4H PRN   albuterol 2.5 mg Q6H PRN   hydrOXYzine 25 mg 4x Daily PRN   calcium carbonate 500 mg TID PRN   glucose 15 g PRN   dextrose 12.5 g PRN

## 2018-09-22 NOTE — PROGRESS NOTES
Hospital Medicine Progress Note     Date:  9/22/2018    PCP: No primary care provider on file. (Tel: None)    Date of Admission: 9/19/2018    Chief complaint:   Chief Complaint   Patient presents with    Shortness of Breath    Wheezing    Cough       Brief hospital course: 43F with hx of heavy tobacco use (1-1.5 PPD for about 9 years, quit for a couple of years, picked back up and finally quit close to 2 years ago) who presents to ER with complaints of nonproductive cough without fever that had been present for a couple of days leading to presentation. She has had runny nose and sneezing, tried OTC cough meds which did not improve her symptoms. Assessment:  Principal Problem:    Acute asthma exacerbation  Active Problems:    Acute respiratory failure with hypercapnia (HCC)    Leukocytosis    Sinus tachycardia    Asthmatic bronchitis    Viral upper respiratory tract infection    SIRS (systemic inflammatory response syndrome) (HCC)    Psoriasis    Anxiety disorder    Morbid obesity with BMI of 45.0-49.9, adult ([facility] [address]    History of asthma  Resolved Problems:    Hyponatremia        Plan:  1. Acute asthma exacerbation, possibly with COPD exacerbation. Likely triggered by URI. On singulair. Continue steroid and breathing treatments. CT-facial bones, no sinusitis. 2. Acute bronchitis. CXR with no infiltrates. Sputum culture with coagulase positive Staph. Leukocytosis improving on levaquin (started 9/21) and we will continue - follow up culture ID and sensitivity. Influenza screen negative. Continue mucinex, phenergan-codeine and tessalon for cough. 3. Acute respiratory failure with hypercapnea. Secondary to underlying bronchitis, asthma exacerbation and possible COPD exacerbation. Continue abx, steroid, breathing rx and wean supplemental oxygen as tolerated. 4. Sinus tachycardia. Suspect secondary to underlying asthma and breathing rx.  CTA-chest negative for PE. TSH normal. Increased cardizem to 60

## 2018-09-23 PROBLEM — J15.211 MSSA (METHICILLIN SUSCEPTIBLE STAPHYLOCOCCUS AUREUS) PNEUMONIA (HCC): Status: ACTIVE | Noted: 2018-09-23

## 2018-09-23 LAB
ALBUMIN SERPL-MCNC: 3.9 G/DL (ref 3.5–5.1)
ALP BLD-CCNC: 72 U/L (ref 38–126)
ALT SERPL-CCNC: 30 U/L (ref 11–66)
ANION GAP SERPL CALCULATED.3IONS-SCNC: 9 MEQ/L (ref 8–16)
AST SERPL-CCNC: 19 U/L (ref 5–40)
BASOPHILS # BLD: 0 %
BASOPHILS ABSOLUTE: 0 THOU/MM3 (ref 0–0.1)
BILIRUB SERPL-MCNC: 0.2 MG/DL (ref 0.3–1.2)
BUN BLDV-MCNC: 16 MG/DL (ref 7–22)
CALCIUM SERPL-MCNC: 9.2 MG/DL (ref 8.5–10.5)
CHLORIDE BLD-SCNC: 102 MEQ/L (ref 98–111)
CO2: 30 MEQ/L (ref 23–33)
CREAT SERPL-MCNC: 0.8 MG/DL (ref 0.4–1.2)
EOSINOPHIL # BLD: 0 %
EOSINOPHILS ABSOLUTE: 0 THOU/MM3 (ref 0–0.4)
ERYTHROCYTE [DISTWIDTH] IN BLOOD BY AUTOMATED COUNT: 13.2 % (ref 11.5–14.5)
ERYTHROCYTE [DISTWIDTH] IN BLOOD BY AUTOMATED COUNT: 43.7 FL (ref 35–45)
GFR SERPL CREATININE-BSD FRML MDRD: 78 ML/MIN/1.73M2
GLUCOSE BLD-MCNC: 107 MG/DL (ref 70–108)
GLUCOSE BLD-MCNC: 114 MG/DL (ref 70–108)
GLUCOSE BLD-MCNC: 129 MG/DL (ref 70–108)
GLUCOSE BLD-MCNC: 142 MG/DL (ref 70–108)
GLUCOSE BLD-MCNC: 170 MG/DL (ref 70–108)
GRAM STAIN RESULT: ABNORMAL
HCT VFR BLD CALC: 47.6 % (ref 37–47)
HEMOGLOBIN: 15.7 GM/DL (ref 12–16)
IMMATURE GRANS (ABS): 0.84 THOU/MM3 (ref 0–0.07)
LYMPHOCYTES # BLD: 9 %
LYMPHOCYTES ABSOLUTE: 1.8 THOU/MM3 (ref 1–4.8)
MAGNESIUM: 2.6 MG/DL (ref 1.6–2.4)
MCH RBC QN AUTO: 29.8 PG (ref 26–33)
MCHC RBC AUTO-ENTMCNC: 33 GM/DL (ref 32.2–35.5)
MCV RBC AUTO: 90.5 FL (ref 81–99)
MONOCYTES # BLD: 5 %
MONOCYTES ABSOLUTE: 1 THOU/MM3 (ref 0.4–1.3)
NUCLEATED RED BLOOD CELLS: 0 /100 WBC
ORGANISM: ABNORMAL
PHOSPHORUS: 3.9 MG/DL (ref 2.4–4.7)
PLATELET # BLD: 418 THOU/MM3 (ref 130–400)
PLATELET ESTIMATE: ADEQUATE
PMV BLD AUTO: 8.8 FL (ref 9.4–12.4)
POTASSIUM SERPL-SCNC: 5 MEQ/L (ref 3.5–5.2)
RBC # BLD: 5.26 MILL/MM3 (ref 4.2–5.4)
RESPIRATORY CULTURE: ABNORMAL
SCAN OF BLOOD SMEAR: NORMAL
SEG NEUTROPHILS: 86 %
SEGMENTED NEUTROPHILS ABSOLUTE COUNT: 16.9 THOU/MM3 (ref 1.8–7.7)
SODIUM BLD-SCNC: 141 MEQ/L (ref 135–145)
TOTAL PROTEIN: 6.9 G/DL (ref 6.1–8)
WBC # BLD: 19.6 THOU/MM3 (ref 4.8–10.8)

## 2018-09-23 PROCEDURE — 2580000003 HC RX 258: Performed by: INTERNAL MEDICINE

## 2018-09-23 PROCEDURE — 6360000002 HC RX W HCPCS: Performed by: INTERNAL MEDICINE

## 2018-09-23 PROCEDURE — 36415 COLL VENOUS BLD VENIPUNCTURE: CPT

## 2018-09-23 PROCEDURE — 80053 COMPREHEN METABOLIC PANEL: CPT

## 2018-09-23 PROCEDURE — 6370000000 HC RX 637 (ALT 250 FOR IP): Performed by: INTERNAL MEDICINE

## 2018-09-23 PROCEDURE — 2700000000 HC OXYGEN THERAPY PER DAY

## 2018-09-23 PROCEDURE — 83735 ASSAY OF MAGNESIUM: CPT

## 2018-09-23 PROCEDURE — APPSS30 APP SPLIT SHARED TIME 16-30 MINUTES: Performed by: NURSE PRACTITIONER

## 2018-09-23 PROCEDURE — 99232 SBSQ HOSP IP/OBS MODERATE 35: CPT | Performed by: INTERNAL MEDICINE

## 2018-09-23 PROCEDURE — 84100 ASSAY OF PHOSPHORUS: CPT

## 2018-09-23 PROCEDURE — 94640 AIRWAY INHALATION TREATMENT: CPT

## 2018-09-23 PROCEDURE — 85025 COMPLETE CBC W/AUTO DIFF WBC: CPT

## 2018-09-23 PROCEDURE — 6370000000 HC RX 637 (ALT 250 FOR IP): Performed by: FAMILY MEDICINE

## 2018-09-23 PROCEDURE — 82948 REAGENT STRIP/BLOOD GLUCOSE: CPT

## 2018-09-23 PROCEDURE — 99233 SBSQ HOSP IP/OBS HIGH 50: CPT | Performed by: INTERNAL MEDICINE

## 2018-09-23 PROCEDURE — 6370000000 HC RX 637 (ALT 250 FOR IP): Performed by: NURSE PRACTITIONER

## 2018-09-23 PROCEDURE — 1200000003 HC TELEMETRY R&B

## 2018-09-23 RX ADMIN — BUSPIRONE HYDROCHLORIDE 10 MG: 10 TABLET ORAL at 20:28

## 2018-09-23 RX ADMIN — ENOXAPARIN SODIUM 40 MG: 40 INJECTION SUBCUTANEOUS at 20:29

## 2018-09-23 RX ADMIN — DILTIAZEM HYDROCHLORIDE 60 MG: 60 TABLET, FILM COATED ORAL at 13:08

## 2018-09-23 RX ADMIN — DIPHENHYDRAMINE HCL 50 MG: 25 TABLET ORAL at 20:28

## 2018-09-23 RX ADMIN — BUSPIRONE HYDROCHLORIDE 10 MG: 10 TABLET ORAL at 09:29

## 2018-09-23 RX ADMIN — ENOXAPARIN SODIUM 40 MG: 40 INJECTION SUBCUTANEOUS at 09:29

## 2018-09-23 RX ADMIN — CEFAZOLIN 1 G: 1 INJECTION, POWDER, FOR SOLUTION INTRAMUSCULAR; INTRAVENOUS at 20:32

## 2018-09-23 RX ADMIN — ACETAMINOPHEN 650 MG: 325 TABLET ORAL at 09:31

## 2018-09-23 RX ADMIN — BUSPIRONE HYDROCHLORIDE 10 MG: 10 TABLET ORAL at 13:07

## 2018-09-23 RX ADMIN — Medication 10 ML: at 20:38

## 2018-09-23 RX ADMIN — CETIRIZINE HYDROCHLORIDE 10 MG: 10 TABLET, FILM COATED ORAL at 09:29

## 2018-09-23 RX ADMIN — PANTOPRAZOLE SODIUM 40 MG: 40 TABLET, DELAYED RELEASE ORAL at 04:54

## 2018-09-23 RX ADMIN — CEFAZOLIN 1 G: 1 INJECTION, POWDER, FOR SOLUTION INTRAMUSCULAR; INTRAVENOUS at 15:39

## 2018-09-23 RX ADMIN — DILTIAZEM HYDROCHLORIDE 60 MG: 60 TABLET, FILM COATED ORAL at 04:53

## 2018-09-23 RX ADMIN — MONTELUKAST SODIUM 10 MG: 10 TABLET, FILM COATED ORAL at 20:28

## 2018-09-23 RX ADMIN — BENZONATATE 200 MG: 100 CAPSULE ORAL at 20:28

## 2018-09-23 RX ADMIN — IPRATROPIUM BROMIDE AND ALBUTEROL SULFATE 1 AMPULE: .5; 3 SOLUTION RESPIRATORY (INHALATION) at 08:26

## 2018-09-23 RX ADMIN — IPRATROPIUM BROMIDE AND ALBUTEROL SULFATE 1 AMPULE: .5; 3 SOLUTION RESPIRATORY (INHALATION) at 14:11

## 2018-09-23 RX ADMIN — IPRATROPIUM BROMIDE AND ALBUTEROL SULFATE 1 AMPULE: .5; 3 SOLUTION RESPIRATORY (INHALATION) at 21:50

## 2018-09-23 RX ADMIN — METHYLPREDNISOLONE SODIUM SUCCINATE 40 MG: 40 INJECTION, POWDER, FOR SOLUTION INTRAMUSCULAR; INTRAVENOUS at 04:53

## 2018-09-23 RX ADMIN — INSULIN LISPRO 1 UNITS: 100 INJECTION, SOLUTION INTRAVENOUS; SUBCUTANEOUS at 20:29

## 2018-09-23 RX ADMIN — DILTIAZEM HYDROCHLORIDE 60 MG: 60 TABLET, FILM COATED ORAL at 23:38

## 2018-09-23 RX ADMIN — GUAIFENESIN 600 MG: 600 TABLET, EXTENDED RELEASE ORAL at 20:28

## 2018-09-23 RX ADMIN — DILTIAZEM HYDROCHLORIDE 60 MG: 60 TABLET, FILM COATED ORAL at 17:47

## 2018-09-23 RX ADMIN — METHYLPREDNISOLONE SODIUM SUCCINATE 40 MG: 40 INJECTION, POWDER, FOR SOLUTION INTRAMUSCULAR; INTRAVENOUS at 15:39

## 2018-09-23 RX ADMIN — MAGNESIUM HYDROXIDE 30 ML: 400 SUSPENSION ORAL at 12:38

## 2018-09-23 RX ADMIN — DOXYCYCLINE HYCLATE 100 MG: 100 TABLET, COATED ORAL at 09:28

## 2018-09-23 RX ADMIN — ASPIRIN 81 MG: 81 TABLET, COATED ORAL at 09:29

## 2018-09-23 RX ADMIN — GUAIFENESIN 600 MG: 600 TABLET, EXTENDED RELEASE ORAL at 09:29

## 2018-09-23 RX ADMIN — IPRATROPIUM BROMIDE AND ALBUTEROL SULFATE 1 AMPULE: .5; 3 SOLUTION RESPIRATORY (INHALATION) at 18:21

## 2018-09-23 ASSESSMENT — PAIN DESCRIPTION - PAIN TYPE: TYPE: ACUTE PAIN

## 2018-09-23 ASSESSMENT — PAIN DESCRIPTION - LOCATION: LOCATION: HEAD

## 2018-09-23 ASSESSMENT — PAIN SCALES - GENERAL
PAINLEVEL_OUTOF10: 0
PAINLEVEL_OUTOF10: 2
PAINLEVEL_OUTOF10: 3

## 2018-09-23 NOTE — PROGRESS NOTES
Hospital Medicine Progress Note     Date:  9/23/2018    PCP: No primary care provider on file. (Tel: None)    Date of Admission: 9/19/2018    Chief complaint:   Chief Complaint   Patient presents with    Shortness of Breath    Wheezing    Cough       Brief hospital course: 43F with hx of heavy tobacco use (1-1.5 PPD for about 9 years, quit for a couple of years, picked back up and finally quit close to 2 years ago) who presents to ER with complaints of nonproductive cough without fever that had been present for a couple of days leading to presentation. She has had runny nose and sneezing, tried OTC cough meds which did not improve her symptoms. Assessment:  Principal Problem:    Acute asthma exacerbation  Active Problems:    Acute respiratory failure with hypercapnia (HCC)    Leukocytosis    Sinus tachycardia    MSSA (methicillin susceptible Staphylococcus aureus) pneumonia (HCC)    Asthmatic bronchitis    Viral upper respiratory tract infection    Sepsis due to pneumonia ([facility] [address]    Psoriasis    Anxiety disorder    Morbid obesity with BMI of 45.0-49.9, adult ([facility] [address]    History of asthma  Resolved Problems:    Hyponatremia        Plan:  1. Acute asthma exacerbation in the setting of URI, possible pneumonia. Continue steroid, singulair and breathing treatments. CT-facial bones, no sinusitis. 2. Suspected MSSA pneumonia. Sputum with MSSA. CXR from 9/22 with new left lower lobe opacity. Suspect POA but likely not noted due to ?dehydration. Changed antibiotics to ancef 9/23. Blood cultures NGTD, rapid influenza screen negative. Dr. Yvette Oconnell and Dr. Brittany Kessler on board and assisting with management. 3. Sepsis secondary to pneumonia. Had leukocytosis, tachycardia in the setting of respiratory failure. Overall, improving with antibiotics. Lactic acid was normal. Was not considered septic on admission; no fluid boluses as could worsen respiratory status at this time.   4. Acute respiratory failure with 40 mg Oral QAM AC    enoxaparin  40 mg Subcutaneous BID    aspirin  81 mg Oral Daily    methylPREDNISolone  40 mg Intravenous Q12H    insulin lispro  0-6 Units Subcutaneous TID WC    insulin lispro  0-3 Units Subcutaneous Nightly    sodium chloride flush  10 mL Intravenous 2 times per day    ipratropium-albuterol  1 ampule Inhalation Q4H WA    guaiFENesin  600 mg Oral BID    busPIRone  10 mg Oral TID       Infusions:    dextrose           PRN Meds: benzonatate, promethazine-codeine, albuterol sulfate HFA, albuterol, hydrOXYzine, calcium carbonate, glucose, dextrose, glucagon (rDNA), dextrose, sodium chloride flush, magnesium hydroxide, ondansetron, potassium chloride **OR** potassium chloride **OR** potassium chloride, potassium chloride, magnesium sulfate, acetaminophen    Labs/imaging:  CBC:   Recent Labs      09/22/18   1009  09/23/18   0856   WBC  22.4*  19.6*   HGB  15.5  15.7   PLT  472*  418*         BMP:    Recent Labs      09/21/18   0540  09/22/18   0506  09/23/18   0604   NA  141  139  141   K  4.5  4.5  5.0   CL  103  102  102   CO2  25  26  30   BUN  18  16  16   CREATININE  0.7  0.7  0.8   GLUCOSE  131*  128*  114*         Hepatic:   Recent Labs      09/21/18   0540  09/22/18   0506  09/23/18   0604   AST  26  20  19   ALT  28  28  30   BILITOT  0.2*  0.2*  0.2*   ALKPHOS  67  67  72         Юлия Vo MD  -------------------------------  Rounding hospitalist

## 2018-09-23 NOTE — CONSULTS
Consults                                      CONSULTATION NOTE :ID       Patient - Tosin Man,  Age - 39 y.o.    - 1975      Room Number - 6K-08/008-A   N -  [id number]   [facility]  Date of Admission -  2018  5:50 AM  Patient's PCP: No primary care provider on file. Requesting Physician: Jg Parnell MD    REASON FOR CONSULTATION   Positive sputum culture report    HISTORY OF PRESENT ILLNESS       This is a very pleasant 39 y.o. female who was admitted to the hospital with a chief complaints of ccough shortness of breath and wheeze. She has underlying asthma and history of chronic smoking. Occasionally she gets cough and wheeze when the weather changes. she had history of cough running nose prior to her admission. Despite treatment over the count she'll continue to be very short of she had CT scan of the chest which did not show any pulmonary embolism however she is now coughing with yellowish sputumand continues to have wheeze. .  She denies any fever or chills no previous history of intubation. She does not follow with pulmonary  Physician. Her sputum is growing MSSA.     PAST MEDICAL AND SURGICAL HISTORY       Past Medical History:   Diagnosis Date    Anxiety disorder     History of asthma     Morbid obesity with BMI of 45.0-49.9, adult [facility])        Past Surgical History:   Procedure Laterality Date    CARDIOVASCULAR STRESS TEST  11    EF 67%    DIAGNOSTIC CARDIAC CATH LAB PROCEDURE  11    EF 60%    DOPPLER ECHOCARDIOGRAPHY  11    EF 60%    KNEE ARTHROSCOPY      vicky knee    TONSILLECTOMY           MEDICATIONS PRIOR TO ADMISSION:       Scheduled Meds:   ceFAZolin  1 g Intravenous Q8H    diltiazem  60 mg Oral 4 times per day    cetirizine  10 mg Oral Daily    diphenhydrAMINE  50 mg Oral Nightly    montelukast  10 mg Oral Nightly    pantoprazole  40 mg Oral QAM AC    enoxaparin  40 mg Subcutaneous BID    aspirin  81 mg Oral Daily    methylPREDNISolone  40 mg Intravenous Q12H    insulin lispro  0-6 Units Subcutaneous TID WC    insulin lispro  0-3 Units Subcutaneous Nightly    sodium chloride flush  10 mL Intravenous 2 times per day    ipratropium-albuterol  1 ampule Inhalation Q4H WA    guaiFENesin  600 mg Oral BID    busPIRone  10 mg Oral TID     Continuous Infusions:   dextrose       PRN Meds:benzonatate, promethazine-codeine, albuterol sulfate HFA, albuterol, hydrOXYzine, calcium carbonate, glucose, dextrose, glucagon (rDNA), dextrose, sodium chloride flush, magnesium hydroxide, ondansetron, potassium chloride **OR** potassium chloride **OR** potassium chloride, potassium chloride, magnesium sulfate, acetaminophen  Allergies:   ALLERGIES: Patient has no known allergies. SOCIAL HISTORY:     TOBACCO:   reports that she has been smoking Cigarettes. She has a 14.00 pack-year smoking history. She has never used smokeless tobacco.     ETOH:   reports that she does not drink alcohol. Patient currently lives with family       FAMILY HISTORY:     Family History   Problem Relation Age of Onset    Hypertension Mother     Hypertension Father        REVIEW OF SYSTEMS:     Constitutional: no fever, no night sweats, +fatigue. Head: no head ache , no head injury, no migranes. Eye: no blurring of vision, no double vision.   Ears: no hearing difficulty, no tinnitus  Mouth/throat: no ulceration, dental caries , dysphagia  Lungs: +cough no chest pain, she has wheezes  CVS: no palpitation, no chest pain,+ shortness of breath  GI: no abdominal pain, no nausea , no vomiting, no constipation  DIEUDONNE: no dysuria, frequency and urgency, no hematuria, no kidney stones  Musculoskeletal: no joint pain, swelling , stiffness,  Endocrine: no polyuria, polydipsia, no cold or heat intolerance  Hematology: no anemia, no easy brusing or bleeding, no hx of clotting disorder  Dermatology: no skin rash, no eczema, no pruritis,  Psychiatry: no depression, no ABGs: No results found for: PHART, PO2ART, FVE7QZB, LFR1MQQ, BEART    Cultures:      CXR:       UA: No results for input(s): SPECGRAV, PHUR, COLORU, CLARITYU, MUCUS, PROTEINU, BLOODU, RBCUA, [address], BACTERIA, NITRU, GLUCOSEU, BILIRUBINUR, UROBILINOGEN, KETUA, LABCAST, LABCASTTY, AMORPHOS in the last 72 hours. Invalid input(s): CRYSTALS      IMAGING:    Micro:   Lab Results   Component Value Date    BC No growth-preliminary 09/19/2018       Problem list of patient      Patient Active Problem List   Diagnosis Code    [facility] E66.01    Acute asthma exacerbation J45. 901    Asthmatic bronchitis J45.909    Viral upper respiratory tract infection J06.9    SIRS (systemic inflammatory response syndrome) ([facility]) R65.10    Psoriasis L40.9    Acute respiratory failure with hypercapnia ([facility]) J96.02    Leukocytosis D72.829    Sinus tachycardia R00.0    Anxiety disorder F41.9    Morbid obesity with BMI of 45.0-49.9, adult ([facility]) E66.01, Z68.42    History of asthma Z87.09           ASSESSMENT AND PLAN   Asthma  Bronchitis with reactive airway  Positive sputum with MSSA: Antibiotic has been changed to Ancef  Continue respiratory treatment. Patient will need to have PFT as outpatient. Thank you Adin Ndiaye MD for allowing me to participate in this patient's care.     Emily Vieyra MD,FACP 9/23/2018 2:14 PM

## 2018-09-23 NOTE — PROGRESS NOTES
orthopnea or paroxysmal nocturnal dyspnea. Shedenies any fever, chills or rigors at this time. Past 24 hour events/ROS  Still no progress  CXR with new LLL infiltrate  Sputum culture with heavy growth of staph coag +: ID to see  Remains Afebrile  % on 2 Liters   Stress test negative for ischemia  Awaiting ENT to see      PMHx   Past Medical History      Diagnosis Date    Anxiety disorder     History of asthma     Morbid obesity with BMI of 45.0-49.9, adult [facility])       Past Surgical History        Procedure Laterality Date    CARDIOVASCULAR STRESS TEST  08/24/11    EF 67%    DIAGNOSTIC CARDIAC CATH LAB PROCEDURE  08/29/11    EF 60%    DOPPLER ECHOCARDIOGRAPHY  08/23/11    EF 60%    KNEE ARTHROSCOPY      vicky knee    TONSILLECTOMY       Meds    Current Medications    ceFAZolin  1 g Intravenous Q8H    diltiazem  60 mg Oral 4 times per day    cetirizine  10 mg Oral Daily    diphenhydrAMINE  50 mg Oral Nightly    montelukast  10 mg Oral Nightly    pantoprazole  40 mg Oral QAM AC    enoxaparin  40 mg Subcutaneous BID    aspirin  81 mg Oral Daily    methylPREDNISolone  40 mg Intravenous Q12H    insulin lispro  0-6 Units Subcutaneous TID WC    insulin lispro  0-3 Units Subcutaneous Nightly    sodium chloride flush  10 mL Intravenous 2 times per day    ipratropium-albuterol  1 ampule Inhalation Q4H WA    guaiFENesin  600 mg Oral BID    busPIRone  10 mg Oral TID     benzonatate, promethazine-codeine, albuterol sulfate HFA, albuterol, hydrOXYzine, calcium carbonate, glucose, dextrose, glucagon (rDNA), dextrose, sodium chloride flush, magnesium hydroxide, ondansetron, potassium chloride **OR** potassium chloride **OR** potassium chloride, potassium chloride, magnesium sulfate, acetaminophen  IV Drips/Infusions   dextrose       Diet    DIET GENERAL; No Added Salt (3-4 GM)  Allergies    Patient has no known allergies.       Vitals     height is 5' 9\" (1.753 m) and weight is 309 lb 14.4 oz (140.6 kg) (abnormal). Her oral temperature is 97.6 °F (36.4 °C). Her blood pressure is 132/91 (abnormal) and her pulse is 93. Her respiration is 18 and oxygen saturation is 92%. Body mass index is 45.76 kg/m². SUPPLEMENTAL O2: O2 Flow Rate (L/min): 2 L/min       I/O        Intake/Output Summary (Last 24 hours) at 09/23/18 1323  Last data filed at 09/22/18 1412   Gross per 24 hour   Intake             1000 ml   Output             1700 ml   Net             -700 ml     I/O last 3 completed shifts: In: 1000 [P.O.:1000]  Out: 1700 [Urine:1700]   Patient Vitals for the past 96 hrs (Last 3 readings):   Weight   09/22/18 0424 (!) 309 lb 14.4 oz (140.6 kg)   09/21/18 0310 (!) 312 lb 9.6 oz (141.8 kg)   09/20/18 0307 (!) 311 lb 8 oz (141.3 kg)       Exam   General Appearance: Moderately built, moderately nourished in NAD on 2 Liters NC.  HEENT: Head is normocephalic, atraumatic. Neck - Supple, No JVD present. No tracheal deviation. Lungs - Bilateral air entry present. Respirations unlabored. Breath sounds are diminished with scattered E-wheezes. No rales or rhonchi. Cardiovascular - HRRR normal S1, S2, no  murmur, gallop or rub. ST. Abdomen - Soft, nontender, nondistended, normal BS. Neurologic - Awake, alert, oriented. No focal changes. Extremities - Warm and dry. no cyanosis, clubbing or edema. Skin - No bruising or bleeding.     Labs    ABG  Lab Results   Component Value Date    PH 7.42 09/20/2018    PO2 67 09/20/2018    PCO2 42 09/20/2018    HCO3 28 09/20/2018    O2SAT 93 09/20/2018     Lab Results   Component Value Date    IFIO2 2 09/20/2018     CBC  Recent Labs      09/22/18   1009  09/23/18   0856   WBC  22.4*  19.6*   RBC  5.28  5.26   HGB  15.5  15.7   HCT  48.2*  47.6*   MCV  91.3  90.5   MCH  29.4  29.8   MCHC  32.2  33.0   PLT  472*  418*   MPV  9.0*  8.8*      BMP  Recent Labs      09/21/18   0540  09/22/18   0506  09/23/18   0604   NA  141  139  141   K  4.5  4.5  5.0   CL  103  102  102   CO2  25  26  30 This can indicate mild atelectasis or an early pneumonia       Sep 19, 2018  Narrative PROCEDURE: XR CHEST (2 VW)  1. Unremarkable PA and lateral views of the chest.      CT Scans  (See actual reports for details)  Sep 19, 2018  PROCEDURE: CTA CHEST W [address]  Within The limitations discussed above there is no acute process identified. No central or proximal branch pulmonary emboli are seen. CT sinuses 9/21/18   FINDINGS:       Frontal sinuses: Normally aerated and pneumatized. The frontal ethmoidal recesses are patent.       Ethmoid air cells: Normally aerated and pneumatized. The lamina papyracea are intact. The cribriform plates, lateral lamella and fovea ethmoidalis are relatively symmetric.       Sphenoid sinus: Normally aerated and pneumatized. Mucosal coaptation obstructs the bilateral sphenoethmoidal recesses.       Maxillary sinuses: Normally aerated and pneumatized. Mucosal coaptation narrows the right ostiomeatal unit and there is mucosal coaptation which obstructs the left ostiomeatal unit.       Nasal cavity: There is an S-shaped nasal septal deviation. Note is made that the right inferior nasal turbinate and right middle nasal turbinate abuts the wall of the nasal cavity which may correspond to the nasal cycle or nasal turbinate hypertrophy. No    polyps or masses are noted.       The mastoid air cells and middle ear cavities are normally aerated.       There are no suspicious findings in the imaged aspects of the brain parenchyma and facial soft tissues.           Impression       1. No evidence to suggest acute sinusitis.       2. There is mucosal coaptation which obstructs the bilateral sphenoethmoidal recesses and left ostiomeatal unit. Mucosal coaptation causes narrowing of the right ostiomeatal unit.       3.  There is an S-shaped nasal septal deviation.           Assessment   Acute Bronchial asthma exacerbation due to acute bronchitis with staph coag + sputum (she reports her daughter had URI a week ago)  New LLL infiltrated on CXR  History of childhood asthma  Possible COPD with a history of tobacco smoking for 20 years: She quit smoking 1year and 8 months back and has no second hand exposure. Abnormal CT of sinuses  Seasonal/Environmental Allergies on Zyrtec  GERD on PPI  Elevated troponin level: Stress test showed no ischemia  Anxiety disorder  Morbid obesity with BMI 46  Full Code    Plan   -Continue Solumedrol/DuoNebs/Mucinex/IS/Acapella  -Continue Doxycycline  -Add Vanco with ID consult  -Continue to follow final C&S  -ENT to see  -Wean Oxygen to keep Spo2 >90%. -Home 02 evaluation at discharge  -Will need out patient full PFTs at the time of discharge with possible allergy work up  -PT/OT  -DVT prophylaxis with Lovenox     Case discussed with patient and nurse/Dr. Tino Galindo. Questions and concerns addressed. Electronically signed by   Denys Saint, APRN - CNP on 9/23/2018 at 1:23 PM     Addendum by Dr. Tino Galindo MD:  I have seen and examined the patient independently. Face to face evaluation and examination was performed. The above evaluation and note has been reviewed. Labs and radiographs were reviewed. I Have discussed with Ms. Jaymie Valenzuela CNP about this patient in detail. The above assessment and plan has been reviewed. Please see my modifications mentioned below. My modifications:  No clinical improvement from yesterday. Worsening of cough and sputum production. ID consult.     Diane Tracy MD 9/23/2018 2:22 PM

## 2018-09-23 NOTE — PLAN OF CARE
Problem: Falls - Risk of:  Goal: Will remain free from falls  Will remain free from falls   Outcome: Ongoing  Patient in bed with bed alarm on, call light within reach and being used appropriately. Non-skid socks on when patient is out of bed. Problem: Pain:  Goal: Control of acute pain  Control of acute pain   Outcome: Ongoing  Patient denies the presence of pain at this time. Will continue monitoring for changes. Problem: Discharge Planning:  Goal: Discharged to appropriate level of care  Discharged to appropriate level of care   Outcome: Ongoing  Patient plans to return home with  when discharged. Problem: Anxiety/Stress:  Goal: Level of anxiety will decrease  Level of anxiety will decrease   Outcome: Ongoing  Patient appears slightly anxious tonight. Patient states anxiety is due to lack of sleep during stay. Problem: Cardiac Output - Decreased:  Goal: Hemodynamic stability will improve  Hemodynamic stability will improve   Outcome: Ongoing  Will continue monitoring. Problem: Gas Exchange - Impaired:  Goal: Levels of oxygenation will improve  Levels of oxygenation will improve   Outcome: Ongoing  Patient's SPO2 93% on 2L nasal cannula. Will attempt to wean supplemental O2 as tolerated. Problem: Activity Intolerance:  Goal: Ability to perform activities of daily living will improve  Ability to perform activities of daily living will improve   Outcome: Ongoing  Patient able to walk to the bathroom with decreasing shortness of breath. Comments: Care plan reviewed with patient. Patient verbalize understanding of the plan of care and contribute to goal setting.

## 2018-09-23 NOTE — FLOWSHEET NOTE
09/23/18 1206   Provider Notification   Reason for Communication Evaluate   Provider Name Clifm    Provider Notification Physician   Method of Communication Secure Message   Response Other (Comment)   Notification Time [phone]   11:56 am  [phone] From: Emre Robison RN [facility] RE: Jennifer Sheikh [id number] - consult for antibiotic therapy for staphy in her sputum. Getting worse on Doxycycline. Read 11:58 am   12:01 pm  Ok.  Keep on my list

## 2018-09-24 LAB
ALBUMIN SERPL-MCNC: 3.8 G/DL (ref 3.5–5.1)
ALP BLD-CCNC: 66 U/L (ref 38–126)
ALT SERPL-CCNC: 27 U/L (ref 11–66)
ANION GAP SERPL CALCULATED.3IONS-SCNC: 11 MEQ/L (ref 8–16)
AST SERPL-CCNC: 15 U/L (ref 5–40)
BASOPHILS # BLD: 0.6 %
BASOPHILS ABSOLUTE: 0.1 THOU/MM3 (ref 0–0.1)
BILIRUB SERPL-MCNC: 0.3 MG/DL (ref 0.3–1.2)
BLOOD CULTURE, ROUTINE: NORMAL
BLOOD CULTURE, ROUTINE: NORMAL
BUN BLDV-MCNC: 15 MG/DL (ref 7–22)
CALCIUM SERPL-MCNC: 8.9 MG/DL (ref 8.5–10.5)
CHLORIDE BLD-SCNC: 100 MEQ/L (ref 98–111)
CO2: 27 MEQ/L (ref 23–33)
CREAT SERPL-MCNC: 0.8 MG/DL (ref 0.4–1.2)
EOSINOPHIL # BLD: 0 %
EOSINOPHILS ABSOLUTE: 0 THOU/MM3 (ref 0–0.4)
ERYTHROCYTE [DISTWIDTH] IN BLOOD BY AUTOMATED COUNT: 12.9 % (ref 11.5–14.5)
ERYTHROCYTE [DISTWIDTH] IN BLOOD BY AUTOMATED COUNT: 40.6 FL (ref 35–45)
GFR SERPL CREATININE-BSD FRML MDRD: 78 ML/MIN/1.73M2
GLUCOSE BLD-MCNC: 126 MG/DL (ref 70–108)
GLUCOSE BLD-MCNC: 133 MG/DL (ref 70–108)
GLUCOSE BLD-MCNC: 141 MG/DL (ref 70–108)
GLUCOSE BLD-MCNC: 157 MG/DL (ref 70–108)
GLUCOSE BLD-MCNC: 163 MG/DL (ref 70–108)
HCT VFR BLD CALC: 46.7 % (ref 37–47)
HEMOGLOBIN: 15.8 GM/DL (ref 12–16)
IMMATURE GRANS (ABS): 1.01 THOU/MM3 (ref 0–0.07)
IMMATURE GRANULOCYTES: 4.6 %
LYMPHOCYTES # BLD: 9.1 %
LYMPHOCYTES ABSOLUTE: 2 THOU/MM3 (ref 1–4.8)
MAGNESIUM: 2.4 MG/DL (ref 1.6–2.4)
MCH RBC QN AUTO: 29.6 PG (ref 26–33)
MCHC RBC AUTO-ENTMCNC: 33.8 GM/DL (ref 32.2–35.5)
MCV RBC AUTO: 87.5 FL (ref 81–99)
MONOCYTES # BLD: 7.8 %
MONOCYTES ABSOLUTE: 1.7 THOU/MM3 (ref 0.4–1.3)
NUCLEATED RED BLOOD CELLS: 0 /100 WBC
PHOSPHORUS: 3.8 MG/DL (ref 2.4–4.7)
PLATELET # BLD: 411 THOU/MM3 (ref 130–400)
PMV BLD AUTO: 8.5 FL (ref 9.4–12.4)
POTASSIUM SERPL-SCNC: 4.4 MEQ/L (ref 3.5–5.2)
RBC # BLD: 5.34 MILL/MM3 (ref 4.2–5.4)
SEG NEUTROPHILS: 77.9 %
SEGMENTED NEUTROPHILS ABSOLUTE COUNT: 17 THOU/MM3 (ref 1.8–7.7)
SODIUM BLD-SCNC: 138 MEQ/L (ref 135–145)
TOTAL PROTEIN: 6.7 G/DL (ref 6.1–8)
WBC # BLD: 21.8 THOU/MM3 (ref 4.8–10.8)

## 2018-09-24 PROCEDURE — 1200000003 HC TELEMETRY R&B

## 2018-09-24 PROCEDURE — 6360000002 HC RX W HCPCS: Performed by: INTERNAL MEDICINE

## 2018-09-24 PROCEDURE — 99232 SBSQ HOSP IP/OBS MODERATE 35: CPT | Performed by: INTERNAL MEDICINE

## 2018-09-24 PROCEDURE — 94640 AIRWAY INHALATION TREATMENT: CPT

## 2018-09-24 PROCEDURE — 2700000000 HC OXYGEN THERAPY PER DAY

## 2018-09-24 PROCEDURE — APPSS30 APP SPLIT SHARED TIME 16-30 MINUTES: Performed by: NURSE PRACTITIONER

## 2018-09-24 PROCEDURE — 2580000003 HC RX 258: Performed by: INTERNAL MEDICINE

## 2018-09-24 PROCEDURE — 6370000000 HC RX 637 (ALT 250 FOR IP): Performed by: INTERNAL MEDICINE

## 2018-09-24 PROCEDURE — 84100 ASSAY OF PHOSPHORUS: CPT

## 2018-09-24 PROCEDURE — 99233 SBSQ HOSP IP/OBS HIGH 50: CPT | Performed by: INTERNAL MEDICINE

## 2018-09-24 PROCEDURE — 36415 COLL VENOUS BLD VENIPUNCTURE: CPT

## 2018-09-24 PROCEDURE — 83735 ASSAY OF MAGNESIUM: CPT

## 2018-09-24 PROCEDURE — 85025 COMPLETE CBC W/AUTO DIFF WBC: CPT

## 2018-09-24 PROCEDURE — 6370000000 HC RX 637 (ALT 250 FOR IP): Performed by: FAMILY MEDICINE

## 2018-09-24 PROCEDURE — 80053 COMPREHEN METABOLIC PANEL: CPT

## 2018-09-24 PROCEDURE — 94760 N-INVAS EAR/PLS OXIMETRY 1: CPT

## 2018-09-24 PROCEDURE — 82948 REAGENT STRIP/BLOOD GLUCOSE: CPT

## 2018-09-24 RX ADMIN — MAGESIUM CITRATE 296 ML: 1.75 LIQUID ORAL at 10:33

## 2018-09-24 RX ADMIN — DILTIAZEM HYDROCHLORIDE 60 MG: 60 TABLET, FILM COATED ORAL at 05:40

## 2018-09-24 RX ADMIN — CEFAZOLIN 1 G: 1 INJECTION, POWDER, FOR SOLUTION INTRAMUSCULAR; INTRAVENOUS at 05:40

## 2018-09-24 RX ADMIN — IPRATROPIUM BROMIDE AND ALBUTEROL SULFATE 1 AMPULE: .5; 3 SOLUTION RESPIRATORY (INHALATION) at 13:22

## 2018-09-24 RX ADMIN — METHYLPREDNISOLONE SODIUM SUCCINATE 40 MG: 40 INJECTION, POWDER, FOR SOLUTION INTRAMUSCULAR; INTRAVENOUS at 16:35

## 2018-09-24 RX ADMIN — DILTIAZEM HYDROCHLORIDE 60 MG: 60 TABLET, FILM COATED ORAL at 16:35

## 2018-09-24 RX ADMIN — ACETAMINOPHEN 650 MG: 325 TABLET ORAL at 06:17

## 2018-09-24 RX ADMIN — METHYLPREDNISOLONE SODIUM SUCCINATE 40 MG: 40 INJECTION, POWDER, FOR SOLUTION INTRAMUSCULAR; INTRAVENOUS at 04:03

## 2018-09-24 RX ADMIN — BUSPIRONE HYDROCHLORIDE 10 MG: 10 TABLET ORAL at 14:28

## 2018-09-24 RX ADMIN — GUAIFENESIN 600 MG: 600 TABLET, EXTENDED RELEASE ORAL at 21:05

## 2018-09-24 RX ADMIN — Medication 1 UNITS: at 16:35

## 2018-09-24 RX ADMIN — BENZONATATE 200 MG: 100 CAPSULE ORAL at 09:10

## 2018-09-24 RX ADMIN — DILTIAZEM HYDROCHLORIDE 60 MG: 60 TABLET, FILM COATED ORAL at 11:31

## 2018-09-24 RX ADMIN — Medication 10 ML: at 20:56

## 2018-09-24 RX ADMIN — IPRATROPIUM BROMIDE AND ALBUTEROL SULFATE 1 AMPULE: .5; 3 SOLUTION RESPIRATORY (INHALATION) at 21:05

## 2018-09-24 RX ADMIN — BUSPIRONE HYDROCHLORIDE 10 MG: 10 TABLET ORAL at 21:05

## 2018-09-24 RX ADMIN — GUAIFENESIN 600 MG: 600 TABLET, EXTENDED RELEASE ORAL at 09:09

## 2018-09-24 RX ADMIN — INSULIN LISPRO 1 UNITS: 100 INJECTION, SOLUTION INTRAVENOUS; SUBCUTANEOUS at 20:56

## 2018-09-24 RX ADMIN — CEFAZOLIN 1 G: 1 INJECTION, POWDER, FOR SOLUTION INTRAMUSCULAR; INTRAVENOUS at 12:55

## 2018-09-24 RX ADMIN — ASPIRIN 81 MG: 81 TABLET, COATED ORAL at 09:09

## 2018-09-24 RX ADMIN — Medication 1 UNITS: at 11:29

## 2018-09-24 RX ADMIN — MONTELUKAST SODIUM 10 MG: 10 TABLET, FILM COATED ORAL at 21:05

## 2018-09-24 RX ADMIN — Medication 10 ML: at 09:10

## 2018-09-24 RX ADMIN — IPRATROPIUM BROMIDE AND ALBUTEROL SULFATE 1 AMPULE: .5; 3 SOLUTION RESPIRATORY (INHALATION) at 16:36

## 2018-09-24 RX ADMIN — IPRATROPIUM BROMIDE AND ALBUTEROL SULFATE 1 AMPULE: .5; 3 SOLUTION RESPIRATORY (INHALATION) at 08:44

## 2018-09-24 RX ADMIN — BUSPIRONE HYDROCHLORIDE 10 MG: 10 TABLET ORAL at 09:09

## 2018-09-24 RX ADMIN — CEFAZOLIN 1 G: 1 INJECTION, POWDER, FOR SOLUTION INTRAMUSCULAR; INTRAVENOUS at 20:53

## 2018-09-24 RX ADMIN — HYDROXYZINE HYDROCHLORIDE 25 MG: 25 TABLET, FILM COATED ORAL at 23:21

## 2018-09-24 RX ADMIN — DILTIAZEM HYDROCHLORIDE 60 MG: 60 TABLET, FILM COATED ORAL at 23:20

## 2018-09-24 RX ADMIN — ENOXAPARIN SODIUM 40 MG: 40 INJECTION SUBCUTANEOUS at 20:55

## 2018-09-24 RX ADMIN — CETIRIZINE HYDROCHLORIDE 10 MG: 10 TABLET, FILM COATED ORAL at 09:09

## 2018-09-24 RX ADMIN — DIPHENHYDRAMINE HCL 50 MG: 25 TABLET ORAL at 20:53

## 2018-09-24 RX ADMIN — ENOXAPARIN SODIUM 40 MG: 40 INJECTION SUBCUTANEOUS at 09:09

## 2018-09-24 RX ADMIN — PANTOPRAZOLE SODIUM 40 MG: 40 TABLET, DELAYED RELEASE ORAL at 05:40

## 2018-09-24 ASSESSMENT — PAIN SCALES - GENERAL
PAINLEVEL_OUTOF10: 0
PAINLEVEL_OUTOF10: 4

## 2018-09-24 NOTE — PROGRESS NOTES
Nia Sample, WBCUA, BACTERIA, NITRU, GLUCOSEU, BILIRUBINUR, UROBILINOGEN, KETUA, LABCAST, LABCASTTY, AMORPHOS in the last 72 hours. Invalid input(s): CRYSTALS  Micro:   Lab Results   Component Value Date    BC No growth-preliminary 09/19/2018         IMAGING:         Problem list of patient:     Patient Active Problem List   Diagnosis Code    Morbid obesity ([facility] E66.01    Acute asthma exacerbation J45. 0    Asthmatic bronchitis J45.909    Viral upper respiratory tract infection J06.9    Sepsis due to pneumonia ([facility] J18.9, A41.9    Psoriasis L40.9    Acute respiratory failure with hypercapnia ([facility]) J96.02    Leukocytosis D72.829    Sinus tachycardia R00.0    Anxiety disorder F41.9    Morbid obesity with BMI of 45.0-49.9, adult ([facility]) E66.01, Z68.42    History of asthma Z87.09    MSSA (methicillin susceptible Staphylococcus aureus) pneumonia ([facility]) J15.211         ASSESSMENT/PLAN   Pneumonia due to MSSA  Asthma  Reactive air way: may have underlying COPD  Continue current treatment  Will need inhaler and pulmonary function test as out patient      Kvng Freeman MD, FACP 9/24/2018 9:07 AM

## 2018-09-24 NOTE — PROGRESS NOTES
[facility]    Patient - Teetee Jordan   MRN -  [id number]   Acct # - [de-identified]   - 1975      Date of Admission -  2018  5:50 AM  Date of evaluation -  2018  Room - 6-008-A   Hospital Day - 5  Consulting - Woo Feldman MD Primary Care Physician - No primary care provider on file. Problem List      Active Hospital Problems    Diagnosis Date Noted    MSSA (methicillin susceptible Staphylococcus aureus) pneumonia ([facility] [J15.211] 2018    Anxiety disorder [F41.9]     Morbid obesity with BMI of 45.0-49.9, adult ([facility] [E66.01, Z68.42]     History of asthma [Z87.09]     Acute asthma exacerbation [J45.901] 2018    Asthmatic bronchitis [J45.909]     Viral upper respiratory tract infection [J06.9]     Sepsis due to pneumonia ([facility] [J18.9, A41.9]     Psoriasis [L40.9]     Acute respiratory failure with hypercapnia ([facility]) [J96.02]     Leukocytosis [D72.829]     Sinus tachycardia [R00.0]      Reason for Consult    For management of bronchial asthma and exacerbation  HPI   History Obtained From: Patient and electronic medical record. Teetee Jordan is a 39 y.o. female  was initially admitted under hospitalist service. Pulmonary medicine was consulted for further management of Bronchial asthma/exacerbation. The patient is a 39 y.o. female who presented with worsening of shortness of breath for the last 3 days. Her current illness started as gradual worsening of shortness of breath with cough and wheezing. She also noticed decline in her functional status. She started using her rescue inhalers more frequently at home with no significant improvement in hershort ness of breath, cough or wheezing. Due to worsening of her  above symptoms she presented to the Emergency room at [facility].  Shewas evaluated in the Emergency room at [address] [place] [address] by ER physician and was admitted under hospitalist service for further evaluation. Shedenies any history of hemoptysis. Shedenies any history of orthopnea or paroxysmal nocturnal dyspnea. Shedenies any fever, chills or rigors at this time. Past 24 hour events/ROS  Finally made some improvement today with changing ATB to Ancef  Sputum with MSSA  Remains Afebrile  92% on 2 Liters   Still awaiting ENT to see    PMHx   Past Medical History      Diagnosis Date    Anxiety disorder     History of asthma     Morbid obesity with BMI of 45.0-49.9, adult [facility])       Past Surgical History        Procedure Laterality Date    CARDIOVASCULAR STRESS TEST  08/24/11    EF 67%    DIAGNOSTIC CARDIAC CATH LAB PROCEDURE  08/29/11    EF 60%    DOPPLER ECHOCARDIOGRAPHY  08/23/11    EF 60%    KNEE ARTHROSCOPY      vicky knee    TONSILLECTOMY       Meds    Current Medications    ceFAZolin  1 g Intravenous Q8H    diltiazem  60 mg Oral 4 times per day    cetirizine  10 mg Oral Daily    diphenhydrAMINE  50 mg Oral Nightly    montelukast  10 mg Oral Nightly    pantoprazole  40 mg Oral QAM AC    enoxaparin  40 mg Subcutaneous BID    aspirin  81 mg Oral Daily    methylPREDNISolone  40 mg Intravenous Q12H    insulin lispro  0-6 Units Subcutaneous TID WC    insulin lispro  0-3 Units Subcutaneous Nightly    sodium chloride flush  10 mL Intravenous 2 times per day    ipratropium-albuterol  1 ampule Inhalation Q4H WA    guaiFENesin  600 mg Oral BID    busPIRone  10 mg Oral TID     benzonatate, promethazine-codeine, albuterol sulfate HFA, albuterol, hydrOXYzine, calcium carbonate, glucose, dextrose, glucagon (rDNA), dextrose, sodium chloride flush, magnesium hydroxide, ondansetron, potassium chloride **OR** potassium chloride **OR** potassium chloride, potassium chloride, magnesium sulfate, acetaminophen  IV Drips/Infusions   dextrose       Diet    DIET GENERAL; No Added Salt (3-4 GM)  Allergies    Patient has no known allergies.       Vitals     height is 5' 9\" (1.753 m) and weight is 308 lb peribronchial thickening. 2. New left lower lobe opacity posteriorly. This can indicate mild atelectasis or an early pneumonia       Sep 19, 2018  Narrative PROCEDURE: XR CHEST (2 VW)  1. Unremarkable PA and lateral views of the chest.      CT Scans  (See actual reports for details)  Sep 19, 2018  PROCEDURE: CTA CHEST W [address]  Within The limitations discussed above there is no acute process identified. No central or proximal branch pulmonary emboli are seen. CT sinuses 9/21/18   FINDINGS:       Frontal sinuses: Normally aerated and pneumatized. The frontal ethmoidal recesses are patent.       Ethmoid air cells: Normally aerated and pneumatized. The lamina papyracea are intact. The cribriform plates, lateral lamella and fovea ethmoidalis are relatively symmetric.       Sphenoid sinus: Normally aerated and pneumatized. Mucosal coaptation obstructs the bilateral sphenoethmoidal recesses.       Maxillary sinuses: Normally aerated and pneumatized. Mucosal coaptation narrows the right ostiomeatal unit and there is mucosal coaptation which obstructs the left ostiomeatal unit.       Nasal cavity: There is an S-shaped nasal septal deviation. Note is made that the right inferior nasal turbinate and right middle nasal turbinate abuts the wall of the nasal cavity which may correspond to the nasal cycle or nasal turbinate hypertrophy. No    polyps or masses are noted.       The mastoid air cells and middle ear cavities are normally aerated.       There are no suspicious findings in the imaged aspects of the brain parenchyma and facial soft tissues.           Impression       1. No evidence to suggest acute sinusitis.       2. There is mucosal coaptation which obstructs the bilateral sphenoethmoidal recesses and left ostiomeatal unit. Mucosal coaptation causes narrowing of the right ostiomeatal unit.       3.  There is an S-shaped nasal septal deviation.           Assessment   Acute Bronchial asthma exacerbation due to

## 2018-09-24 NOTE — PLAN OF CARE
Problem: Falls - Risk of:  Goal: Will remain free from falls  Will remain free from falls   Outcome: Ongoing  Call light within reach, bed in lowest position, non skid footwear on, room door open, bed alarm on. Pt using call light appropriately. Problem: Pain:  Goal: Pain level will decrease  Pain level will decrease   Outcome: Ongoing  Pt denies pain at this time. Pt repositions herself and is aware to call out if pain develops    Problem: Discharge Planning:  Goal: Discharged to appropriate level of care  Discharged to appropriate level of care   Outcome: Ongoing  Pt plans to return home.  following for assistance with medications and possible oxygen at discharge    Problem: Anxiety/Stress:  Goal: Level of anxiety will decrease  Level of anxiety will decrease   Outcome: Ongoing  Pt states she is more \"relaxed today\" and thinks the new medication buspar is helping    Problem: Cardiac Output - Decreased:  Goal: Hemodynamic stability will improve  Hemodynamic stability will improve   Outcome: Ongoing  Denies chest pain, remains in NSR, heart rate remains tachy at times    Problem: Gas Exchange - Impaired:  Goal: Levels of oxygenation will improve  Levels of oxygenation will improve   Outcome: Ongoing  States shortness of breath is better. On 2 liters, unable to wean today because saturations 91-92%. Comments: Care plan reviewed with patient. Patient verbalize understanding of the plan of care and contribute to goal setting.

## 2018-09-24 NOTE — CARE COORDINATION
9/24/18, 11:36 AM    DISCHARGE BARRIERS    SW order received that pt has no insurance and is concerned about paying for medications. Full SW assessment deferred as pt declines home health and is independent in all area's of ADL's. SW met with pt, discussed [place] program.  Also possible need for home nebulizer. Pt currently on O2, trying to wean off. No anticipated discharge today. SW will assist with medications. Pt denies any additional needs.

## 2018-09-25 LAB
ALBUMIN SERPL-MCNC: 3.5 G/DL (ref 3.5–5.1)
ALP BLD-CCNC: 73 U/L (ref 38–126)
ALT SERPL-CCNC: 29 U/L (ref 11–66)
ANION GAP SERPL CALCULATED.3IONS-SCNC: 11 MEQ/L (ref 8–16)
AST SERPL-CCNC: 18 U/L (ref 5–40)
BASOPHILS # BLD: 1 %
BASOPHILS ABSOLUTE: 0.3 THOU/MM3 (ref 0–0.1)
BILIRUB SERPL-MCNC: 0.3 MG/DL (ref 0.3–1.2)
BUN BLDV-MCNC: 16 MG/DL (ref 7–22)
CALCIUM SERPL-MCNC: 8.8 MG/DL (ref 8.5–10.5)
CHLORIDE BLD-SCNC: 99 MEQ/L (ref 98–111)
CO2: 28 MEQ/L (ref 23–33)
CREAT SERPL-MCNC: 0.8 MG/DL (ref 0.4–1.2)
EKG ATRIAL RATE: 93 BPM
EKG P AXIS: 43 DEGREES
EKG P-R INTERVAL: 146 MS
EKG Q-T INTERVAL: 350 MS
EKG QRS DURATION: 86 MS
EKG QTC CALCULATION (BAZETT): 435 MS
EKG R AXIS: 30 DEGREES
EKG T AXIS: 40 DEGREES
EKG VENTRICULAR RATE: 93 BPM
EOSINOPHIL # BLD: 0 %
EOSINOPHILS ABSOLUTE: 0 THOU/MM3 (ref 0–0.4)
ERYTHROCYTE [DISTWIDTH] IN BLOOD BY AUTOMATED COUNT: 13.2 % (ref 11.5–14.5)
ERYTHROCYTE [DISTWIDTH] IN BLOOD BY AUTOMATED COUNT: 42.7 FL (ref 35–45)
GFR SERPL CREATININE-BSD FRML MDRD: 78 ML/MIN/1.73M2
GLUCOSE BLD-MCNC: 127 MG/DL (ref 70–108)
GLUCOSE BLD-MCNC: 130 MG/DL (ref 70–108)
GLUCOSE BLD-MCNC: 137 MG/DL (ref 70–108)
GLUCOSE BLD-MCNC: 142 MG/DL (ref 70–108)
GLUCOSE BLD-MCNC: 176 MG/DL (ref 70–108)
HCT VFR BLD CALC: 48.3 % (ref 37–47)
HEMOGLOBIN: 15.9 GM/DL (ref 12–16)
LYMPHOCYTES # BLD: 11 %
LYMPHOCYTES ABSOLUTE: 2.9 THOU/MM3 (ref 1–4.8)
MAGNESIUM: 2.8 MG/DL (ref 1.6–2.4)
MCH RBC QN AUTO: 29.4 PG (ref 26–33)
MCHC RBC AUTO-ENTMCNC: 32.9 GM/DL (ref 32.2–35.5)
MCV RBC AUTO: 89.4 FL (ref 81–99)
METAMYELOCYTES: 3 %
MONOCYTES # BLD: 8 %
MONOCYTES ABSOLUTE: 2.1 THOU/MM3 (ref 0.4–1.3)
MYELOCYTES: 1 %
NUCLEATED RED BLOOD CELLS: 0 /100 WBC
PHOSPHORUS: 4.1 MG/DL (ref 2.4–4.7)
PLATELET # BLD: 448 THOU/MM3 (ref 130–400)
PLATELET ESTIMATE: ABNORMAL
PMV BLD AUTO: 8.7 FL (ref 9.4–12.4)
POTASSIUM SERPL-SCNC: 4.7 MEQ/L (ref 3.5–5.2)
RBC # BLD: 5.4 MILL/MM3 (ref 4.2–5.4)
SEG NEUTROPHILS: 76 %
SEGMENTED NEUTROPHILS ABSOLUTE COUNT: 19.9 THOU/MM3 (ref 1.8–7.7)
SODIUM BLD-SCNC: 138 MEQ/L (ref 135–145)
TOTAL PROTEIN: 6.6 G/DL (ref 6.1–8)
WBC # BLD: 26.2 THOU/MM3 (ref 4.8–10.8)

## 2018-09-25 PROCEDURE — 2700000000 HC OXYGEN THERAPY PER DAY

## 2018-09-25 PROCEDURE — 6370000000 HC RX 637 (ALT 250 FOR IP): Performed by: INTERNAL MEDICINE

## 2018-09-25 PROCEDURE — 80053 COMPREHEN METABOLIC PANEL: CPT

## 2018-09-25 PROCEDURE — 84100 ASSAY OF PHOSPHORUS: CPT

## 2018-09-25 PROCEDURE — 2580000003 HC RX 258: Performed by: INTERNAL MEDICINE

## 2018-09-25 PROCEDURE — APPSS30 APP SPLIT SHARED TIME 16-30 MINUTES: Performed by: NURSE PRACTITIONER

## 2018-09-25 PROCEDURE — 36415 COLL VENOUS BLD VENIPUNCTURE: CPT

## 2018-09-25 PROCEDURE — 85025 COMPLETE CBC W/AUTO DIFF WBC: CPT

## 2018-09-25 PROCEDURE — 93005 ELECTROCARDIOGRAM TRACING: CPT

## 2018-09-25 PROCEDURE — 82948 REAGENT STRIP/BLOOD GLUCOSE: CPT

## 2018-09-25 PROCEDURE — 94761 N-INVAS EAR/PLS OXIMETRY MLT: CPT

## 2018-09-25 PROCEDURE — 1200000003 HC TELEMETRY R&B

## 2018-09-25 PROCEDURE — 93010 ELECTROCARDIOGRAM REPORT: CPT | Performed by: INTERNAL MEDICINE

## 2018-09-25 PROCEDURE — 94640 AIRWAY INHALATION TREATMENT: CPT

## 2018-09-25 PROCEDURE — 83735 ASSAY OF MAGNESIUM: CPT

## 2018-09-25 PROCEDURE — 6360000002 HC RX W HCPCS: Performed by: INTERNAL MEDICINE

## 2018-09-25 PROCEDURE — 99233 SBSQ HOSP IP/OBS HIGH 50: CPT | Performed by: HOSPITALIST

## 2018-09-25 PROCEDURE — 6370000000 HC RX 637 (ALT 250 FOR IP): Performed by: FAMILY MEDICINE

## 2018-09-25 PROCEDURE — 99232 SBSQ HOSP IP/OBS MODERATE 35: CPT | Performed by: INTERNAL MEDICINE

## 2018-09-25 RX ORDER — CEFAZOLIN SODIUM 1 G/50ML
1 INJECTION, SOLUTION INTRAVENOUS EVERY 8 HOURS
Status: DISCONTINUED | OUTPATIENT
Start: 2018-09-25 | End: 2018-09-26 | Stop reason: HOSPADM

## 2018-09-25 RX ORDER — PREDNISONE 20 MG/1
40 TABLET ORAL DAILY
Status: DISCONTINUED | OUTPATIENT
Start: 2018-09-26 | End: 2018-09-26 | Stop reason: HOSPADM

## 2018-09-25 RX ADMIN — Medication 10 ML: at 21:25

## 2018-09-25 RX ADMIN — METHYLPREDNISOLONE SODIUM SUCCINATE 40 MG: 40 INJECTION, POWDER, FOR SOLUTION INTRAMUSCULAR; INTRAVENOUS at 04:34

## 2018-09-25 RX ADMIN — IPRATROPIUM BROMIDE AND ALBUTEROL SULFATE 1 AMPULE: .5; 3 SOLUTION RESPIRATORY (INHALATION) at 08:40

## 2018-09-25 RX ADMIN — MONTELUKAST SODIUM 10 MG: 10 TABLET, FILM COATED ORAL at 21:24

## 2018-09-25 RX ADMIN — GUAIFENESIN 600 MG: 600 TABLET, EXTENDED RELEASE ORAL at 21:24

## 2018-09-25 RX ADMIN — DIPHENHYDRAMINE HCL 50 MG: 25 TABLET ORAL at 21:23

## 2018-09-25 RX ADMIN — BUSPIRONE HYDROCHLORIDE 10 MG: 10 TABLET ORAL at 13:17

## 2018-09-25 RX ADMIN — BUSPIRONE HYDROCHLORIDE 10 MG: 10 TABLET ORAL at 21:24

## 2018-09-25 RX ADMIN — CEFAZOLIN SODIUM 1 G: 1 INJECTION, SOLUTION INTRAVENOUS at 21:25

## 2018-09-25 RX ADMIN — ACETAMINOPHEN 650 MG: 325 TABLET ORAL at 19:25

## 2018-09-25 RX ADMIN — CEFAZOLIN 1 G: 1 INJECTION, POWDER, FOR SOLUTION INTRAMUSCULAR; INTRAVENOUS at 06:06

## 2018-09-25 RX ADMIN — PANTOPRAZOLE SODIUM 40 MG: 40 TABLET, DELAYED RELEASE ORAL at 06:06

## 2018-09-25 RX ADMIN — DILTIAZEM HYDROCHLORIDE 60 MG: 60 TABLET, FILM COATED ORAL at 23:18

## 2018-09-25 RX ADMIN — DILTIAZEM HYDROCHLORIDE 60 MG: 60 TABLET, FILM COATED ORAL at 06:06

## 2018-09-25 RX ADMIN — CETIRIZINE HYDROCHLORIDE 10 MG: 10 TABLET, FILM COATED ORAL at 07:52

## 2018-09-25 RX ADMIN — ASPIRIN 81 MG: 81 TABLET, COATED ORAL at 07:52

## 2018-09-25 RX ADMIN — BUSPIRONE HYDROCHLORIDE 10 MG: 10 TABLET ORAL at 07:52

## 2018-09-25 RX ADMIN — GUAIFENESIN 600 MG: 600 TABLET, EXTENDED RELEASE ORAL at 07:52

## 2018-09-25 RX ADMIN — ENOXAPARIN SODIUM 40 MG: 40 INJECTION SUBCUTANEOUS at 07:52

## 2018-09-25 RX ADMIN — CEFAZOLIN 1 G: 1 INJECTION, POWDER, FOR SOLUTION INTRAMUSCULAR; INTRAVENOUS at 13:17

## 2018-09-25 RX ADMIN — Medication 10 ML: at 07:52

## 2018-09-25 RX ADMIN — ENOXAPARIN SODIUM 40 MG: 40 INJECTION SUBCUTANEOUS at 21:24

## 2018-09-25 RX ADMIN — DILTIAZEM HYDROCHLORIDE 60 MG: 60 TABLET, FILM COATED ORAL at 19:25

## 2018-09-25 RX ADMIN — DILTIAZEM HYDROCHLORIDE 60 MG: 60 TABLET, FILM COATED ORAL at 13:17

## 2018-09-25 RX ADMIN — IPRATROPIUM BROMIDE AND ALBUTEROL SULFATE 1 AMPULE: .5; 3 SOLUTION RESPIRATORY (INHALATION) at 12:06

## 2018-09-25 ASSESSMENT — PAIN SCALES - GENERAL
PAINLEVEL_OUTOF10: 3
PAINLEVEL_OUTOF10: 0
PAINLEVEL_OUTOF10: 0

## 2018-09-25 NOTE — PROGRESS NOTES
[facility]    Patient - Michael Wills   MRN -  [id number]   [facility]   - 1975      Date of Admission -  2018  5:50 AM  Date of evaluation -  2018  Room - [facility]-EDUARD Sinclair MD Primary Care Physician - No primary care provider on file. Problem List      Active Hospital Problems    Diagnosis Date Noted    MSSA (methicillin susceptible Staphylococcus aureus) pneumonia ([facility] [J15.211] 2018    Anxiety disorder [F41.9]     Morbid obesity with BMI of 45.0-49.9, adult ([facility] [E66.01, Z68.42]     History of asthma [Z87.09]     Acute asthma exacerbation [J45.901] 2018    Asthmatic bronchitis [J45.909]     Viral upper respiratory tract infection [J06.9]     Sepsis due to pneumonia ([facility] [J18.9, A41.9]     Psoriasis [L40.9]     Acute respiratory failure with hypercapnia (HCC) [J96.02]     Leukocytosis [D72.829]     Sinus tachycardia [R00.0]      Reason for Consult    For management of bronchial asthma and exacerbation  HPI   History Obtained From: Patient and electronic medical record. Michael Wills is a 39 y.o. female  was initially admitted under hospitalist service. Pulmonary medicine was consulted for further management of Bronchial asthma/exacerbation. The patient is a 39 y.o. female who presented with worsening of shortness of breath for the last 3 days. Her current illness started as gradual worsening of shortness of breath with cough and wheezing. She also noticed decline in her functional status. She started using her rescue inhalers more frequently at home with no significant improvement in hershort ness of breath, cough or wheezing. Due to worsening of her  above symptoms she presented to the Emergency room at [facility].  Shewas evaluated in the Emergency room at [address] [place] [address] by ER physician and was admitted under hospitalist service for further evaluation. Shedenies any history of hemoptysis. Shedenies any history of orthopnea or paroxysmal nocturnal dyspnea. Shedenies any fever, chills or rigors at this time. Past 24 hour events/ROS  Continues to feel better  Improved cough  Remains Afebrile  Home 02 evaluation completed: Needs 1 Liter with activity  Nurse called Dr. Rudy Carlin with ENT yesterday: He stated that he reviewed the CT of sinuses and was an over read with no underlying concerns and to cancel consult.      PMHx   Past Medical History      Diagnosis Date    Anxiety disorder     History of asthma     Morbid obesity with BMI of 45.0-49.9, adult [facility])       Past Surgical History        Procedure Laterality Date    CARDIOVASCULAR STRESS TEST  08/24/11    EF 67%    DIAGNOSTIC CARDIAC CATH LAB PROCEDURE  08/29/11    EF 60%    DOPPLER ECHOCARDIOGRAPHY  08/23/11    EF 60%    KNEE ARTHROSCOPY      vicky knee    TONSILLECTOMY       Meds    Current Medications    ceFAZolin  1 g Intravenous Q8H    diltiazem  60 mg Oral 4 times per day    cetirizine  10 mg Oral Daily    diphenhydrAMINE  50 mg Oral Nightly    montelukast  10 mg Oral Nightly    pantoprazole  40 mg Oral QAM AC    enoxaparin  40 mg Subcutaneous BID    aspirin  81 mg Oral Daily    methylPREDNISolone  40 mg Intravenous Q12H    insulin lispro  0-6 Units Subcutaneous TID WC    insulin lispro  0-3 Units Subcutaneous Nightly    sodium chloride flush  10 mL Intravenous 2 times per day    guaiFENesin  600 mg Oral BID    busPIRone  10 mg Oral TID     benzonatate, promethazine-codeine, albuterol sulfate HFA, albuterol, hydrOXYzine, calcium carbonate, glucose, dextrose, glucagon (rDNA), dextrose, sodium chloride flush, magnesium hydroxide, ondansetron, potassium chloride **OR** potassium chloride **OR** potassium chloride, potassium chloride, magnesium sulfate, acetaminophen  IV Drips/Infusions   dextrose       Diet    DIET GENERAL; No Added Salt (3-4 GM)  Allergies    Patient has no Jessy GALLO (Interpreting      Radiology    CXR    9/22/18    1. Mild peribronchial thickening. 2. New left lower lobe opacity posteriorly. This can indicate mild atelectasis or an early pneumonia       Sep 19, 2018  Narrative PROCEDURE: XR CHEST (2 VW)  1. Unremarkable PA and lateral views of the chest.      CT Scans  (See actual reports for details)  Sep 19, 2018  PROCEDURE: CTA CHEST [address]  Within The limitations discussed above there is no acute process identified. No central or proximal branch pulmonary emboli are seen. CT sinuses 9/21/18   FINDINGS:       Frontal sinuses: Normally aerated and pneumatized. The frontal ethmoidal recesses are patent.       Ethmoid air cells: Normally aerated and pneumatized. The lamina papyracea are intact. The cribriform plates, lateral lamella and fovea ethmoidalis are relatively symmetric.       Sphenoid sinus: Normally aerated and pneumatized. Mucosal coaptation obstructs the bilateral sphenoethmoidal recesses.       Maxillary sinuses: Normally aerated and pneumatized. Mucosal coaptation narrows the right ostiomeatal unit and there is mucosal coaptation which obstructs the left ostiomeatal unit.       Nasal cavity: There is an S-shaped nasal septal deviation. Note is made that the right inferior nasal turbinate and right middle nasal turbinate abuts the wall of the nasal cavity which may correspond to the nasal cycle or nasal turbinate hypertrophy. No    polyps or masses are noted.       The mastoid air cells and middle ear cavities are normally aerated.       There are no suspicious findings in the imaged aspects of the brain parenchyma and facial soft tissues.           Impression       1. No evidence to suggest acute sinusitis.       2. There is mucosal coaptation which obstructs the bilateral sphenoethmoidal recesses and left ostiomeatal unit. Mucosal coaptation causes narrowing of the right ostiomeatal unit.       3.  There is an S-shaped nasal septal

## 2018-09-25 NOTE — PROGRESS NOTES
 busPIRone  10 mg Oral TID      dextrose       benzonatate, promethazine-codeine, albuterol sulfate HFA, albuterol, hydrOXYzine, calcium carbonate, glucose, dextrose, glucagon (rDNA), dextrose, sodium chloride flush, magnesium hydroxide, ondansetron, potassium chloride **OR** potassium chloride **OR** potassium chloride, potassium chloride, magnesium sulfate, acetaminophen      LABS:     CBC:   Recent Labs      09/23/18   0856  09/24/18   0551  09/25/18   0448   WBC  19.6*  21.8*  26.2*   HGB  15.7  15.8  15.9   PLT  418*  411*  448*     BMP:    Recent Labs      09/23/18   0604  09/24/18   0551  09/25/18   0448   NA  141  138  138   K  5.0  4.4  4.7   CL  102  100  99   CO2  30  27  28   BUN  16  15  16   CREATININE  0.8  0.8  0.8   GLUCOSE  114*  126*  142*     Calcium:  Recent Labs      09/25/18   0448   CALCIUM  8.8     Ionized Calcium:No results for input(s): IONCA in the last 72 hours. Magnesium:  Recent Labs      09/25/18   0448   MG  2.8*     Phosphorus:  Recent Labs      09/25/18   0448   PHOS  4.1     BNP:No results for input(s): BNP in the last 72 hours. Glucose:  Recent Labs      09/24/18   1949  09/25/18   0629  09/25/18   1046   POCGLU  163*  130*  137*     HgbA1C: No results for input(s): LABA1C in the last 72 hours. INR: No results for input(s): INR in the last 72 hours. Hepatic:   Recent Labs      09/23/18   0604  09/24/18   0551  09/25/18   0448   ALKPHOS  72  66  73   ALT  30  27  29   AST  19  15  18   PROT  6.9  6.7  6.6   BILITOT  0.2*  0.3  0.3   LABALBU  3.9  3.8  3.5     Amylase and Lipase:No results for input(s): LACTA, AMYLASE in the last 72 hours. Lactic Acid: No results for input(s): LACTA in the last 72 hours. Troponin: No results for input(s): CKTOTAL, CKMB, TROPONINI in the last 72 hours. BNP: No results for input(s): BNP in the last 72 hours.     CULTURES:   UA: No results for input(s): SPECGRAV, [address] [facility], [address] [place] [address] 37, [address], [place]UC[place], [address] [place] [address],  Chris [facility], [facility],

## 2018-09-25 NOTE — PLAN OF CARE
Problem: Falls - Risk of:  Goal: Will remain free from falls  Will remain free from falls   Outcome: Ongoing  Call light within reach. Side rails up x2. Bed alarm on. Non skid slippers on when out of bed. Problem: Pain:  Goal: Pain level will decrease  Pain level will decrease   Outcome: Ongoing  Patient free from pain this shift. Pain rated on 0-10 pain rating scale. Will continue to reassess. Problem: Discharge Planning:  Goal: Discharged to appropriate level of care  Discharged to appropriate level of care   Outcome: Ongoing  Patient plans to be discharged to home when medically stable. Comments: Care plan reviewed with patient. Patient verbalizes understanding of the plan of care and contribute to goal setting.

## 2018-09-26 VITALS
TEMPERATURE: 98.2 F | DIASTOLIC BLOOD PRESSURE: 88 MMHG | SYSTOLIC BLOOD PRESSURE: 130 MMHG | RESPIRATION RATE: 20 BRPM | BODY MASS INDEX: 43.4 KG/M2 | HEART RATE: 86 BPM | HEIGHT: 69 IN | WEIGHT: 293 LBS | OXYGEN SATURATION: 92 %

## 2018-09-26 PROBLEM — J45.901 ACUTE ASTHMA EXACERBATION: Status: RESOLVED | Noted: 2018-09-19 | Resolved: 2018-09-26

## 2018-09-26 LAB
BASOPHILS # BLD: 0 %
BASOPHILS ABSOLUTE: 0 THOU/MM3 (ref 0–0.1)
EOSINOPHIL # BLD: 0 %
EOSINOPHILS ABSOLUTE: 0 THOU/MM3 (ref 0–0.4)
ERYTHROCYTE [DISTWIDTH] IN BLOOD BY AUTOMATED COUNT: 13.2 % (ref 11.5–14.5)
ERYTHROCYTE [DISTWIDTH] IN BLOOD BY AUTOMATED COUNT: 42.5 FL (ref 35–45)
GLUCOSE BLD-MCNC: 84 MG/DL (ref 70–108)
GLUCOSE BLD-MCNC: 99 MG/DL (ref 70–108)
HCT VFR BLD CALC: 48.5 % (ref 37–47)
HEMOGLOBIN: 16.2 GM/DL (ref 12–16)
LYMPHOCYTES # BLD: 22 %
LYMPHOCYTES ABSOLUTE: 4.7 THOU/MM3 (ref 1–4.8)
MCH RBC QN AUTO: 29.4 PG (ref 26–33)
MCHC RBC AUTO-ENTMCNC: 33.4 GM/DL (ref 32.2–35.5)
MCV RBC AUTO: 88 FL (ref 81–99)
METAMYELOCYTES: 2 %
MONOCYTES # BLD: 10 %
MONOCYTES ABSOLUTE: 2.1 THOU/MM3 (ref 0.4–1.3)
NUCLEATED RED BLOOD CELLS: 0 /100 WBC
PLATELET # BLD: 393 THOU/MM3 (ref 130–400)
PMV BLD AUTO: 8.5 FL (ref 9.4–12.4)
RBC # BLD: 5.51 MILL/MM3 (ref 4.2–5.4)
SEG NEUTROPHILS: 66 %
SEGMENTED NEUTROPHILS ABSOLUTE COUNT: 14 THOU/MM3 (ref 1.8–7.7)
WBC # BLD: 21.2 THOU/MM3 (ref 4.8–10.8)

## 2018-09-26 PROCEDURE — 36415 COLL VENOUS BLD VENIPUNCTURE: CPT

## 2018-09-26 PROCEDURE — APPSS30 APP SPLIT SHARED TIME 16-30 MINUTES: Performed by: NURSE PRACTITIONER

## 2018-09-26 PROCEDURE — 6360000002 HC RX W HCPCS: Performed by: INTERNAL MEDICINE

## 2018-09-26 PROCEDURE — 99233 SBSQ HOSP IP/OBS HIGH 50: CPT | Performed by: HOSPITALIST

## 2018-09-26 PROCEDURE — 6370000000 HC RX 637 (ALT 250 FOR IP): Performed by: INTERNAL MEDICINE

## 2018-09-26 PROCEDURE — 2580000003 HC RX 258: Performed by: INTERNAL MEDICINE

## 2018-09-26 PROCEDURE — 85025 COMPLETE CBC W/AUTO DIFF WBC: CPT

## 2018-09-26 PROCEDURE — 6370000000 HC RX 637 (ALT 250 FOR IP): Performed by: FAMILY MEDICINE

## 2018-09-26 PROCEDURE — 82948 REAGENT STRIP/BLOOD GLUCOSE: CPT

## 2018-09-26 PROCEDURE — 99232 SBSQ HOSP IP/OBS MODERATE 35: CPT | Performed by: INTERNAL MEDICINE

## 2018-09-26 PROCEDURE — 6370000000 HC RX 637 (ALT 250 FOR IP): Performed by: NURSE PRACTITIONER

## 2018-09-26 RX ORDER — DILTIAZEM HYDROCHLORIDE 240 MG/1
240 CAPSULE, COATED, EXTENDED RELEASE ORAL DAILY
Qty: 7 CAPSULE | Refills: 0 | Status: SHIPPED | OUTPATIENT
Start: 2018-09-26 | End: 2019-03-27

## 2018-09-26 RX ORDER — BUSPIRONE HYDROCHLORIDE 10 MG/1
10 TABLET ORAL 3 TIMES DAILY
Qty: 21 TABLET | Refills: 0 | Status: SHIPPED | OUTPATIENT
Start: 2018-09-26 | End: 2018-10-03

## 2018-09-26 RX ORDER — CEPHALEXIN 250 MG/1
500 CAPSULE ORAL 3 TIMES DAILY
Qty: 60 CAPSULE | Refills: 0 | Status: SHIPPED | OUTPATIENT
Start: 2018-09-26 | End: 2018-10-06

## 2018-09-26 RX ORDER — CEPHALEXIN 500 MG/1
500 CAPSULE ORAL 3 TIMES DAILY
Qty: 30 CAPSULE | Refills: 0 | Status: SHIPPED | OUTPATIENT
Start: 2018-09-26 | End: 2018-09-26

## 2018-09-26 RX ORDER — ALBUTEROL SULFATE 90 UG/1
2 AEROSOL, METERED RESPIRATORY (INHALATION) EVERY 4 HOURS PRN
Qty: 1 INHALER | Refills: 3 | Status: SHIPPED | OUTPATIENT
Start: 2018-09-26 | End: 2019-10-03 | Stop reason: SDUPTHER

## 2018-09-26 RX ORDER — PREDNISONE 10 MG/1
TABLET ORAL
Qty: 30 TABLET | Refills: 0 | Status: SHIPPED | OUTPATIENT
Start: 2018-09-26 | End: 2019-02-28 | Stop reason: ALTCHOICE

## 2018-09-26 RX ORDER — FLUTICASONE PROPIONATE 220 UG/1
2 AEROSOL, METERED RESPIRATORY (INHALATION) 2 TIMES DAILY
Qty: 1 INHALER | Refills: 3 | Status: SHIPPED | OUTPATIENT
Start: 2018-09-26 | End: 2019-03-27

## 2018-09-26 RX ORDER — ASPIRIN 81 MG/1
81 TABLET ORAL DAILY
Qty: 30 TABLET | Refills: 3 | Status: SHIPPED | OUTPATIENT
Start: 2018-09-27

## 2018-09-26 RX ORDER — GUAIFENESIN 600 MG/1
600 TABLET, EXTENDED RELEASE ORAL 2 TIMES DAILY PRN
Qty: 30 TABLET | Refills: 0 | Status: SHIPPED | OUTPATIENT
Start: 2018-09-26 | End: 2019-03-27

## 2018-09-26 RX ADMIN — DILTIAZEM HYDROCHLORIDE 60 MG: 60 TABLET, FILM COATED ORAL at 11:20

## 2018-09-26 RX ADMIN — CEFAZOLIN SODIUM 1 G: 1 INJECTION, SOLUTION INTRAVENOUS at 15:57

## 2018-09-26 RX ADMIN — ASPIRIN 81 MG: 81 TABLET, COATED ORAL at 10:13

## 2018-09-26 RX ADMIN — PREDNISONE 40 MG: 20 TABLET ORAL at 10:12

## 2018-09-26 RX ADMIN — PANTOPRAZOLE SODIUM 40 MG: 40 TABLET, DELAYED RELEASE ORAL at 05:24

## 2018-09-26 RX ADMIN — Medication 10 ML: at 10:13

## 2018-09-26 RX ADMIN — ACETAMINOPHEN 650 MG: 325 TABLET ORAL at 10:10

## 2018-09-26 RX ADMIN — CETIRIZINE HYDROCHLORIDE 10 MG: 10 TABLET, FILM COATED ORAL at 10:12

## 2018-09-26 RX ADMIN — CEFAZOLIN SODIUM 1 G: 1 INJECTION, SOLUTION INTRAVENOUS at 05:23

## 2018-09-26 RX ADMIN — ENOXAPARIN SODIUM 40 MG: 40 INJECTION SUBCUTANEOUS at 10:10

## 2018-09-26 RX ADMIN — DILTIAZEM HYDROCHLORIDE 60 MG: 60 TABLET, FILM COATED ORAL at 05:23

## 2018-09-26 RX ADMIN — BUSPIRONE HYDROCHLORIDE 10 MG: 10 TABLET ORAL at 10:12

## 2018-09-26 RX ADMIN — BUSPIRONE HYDROCHLORIDE 10 MG: 10 TABLET ORAL at 15:57

## 2018-09-26 RX ADMIN — GUAIFENESIN 600 MG: 600 TABLET, EXTENDED RELEASE ORAL at 10:12

## 2018-09-26 ASSESSMENT — PAIN SCALES - GENERAL
PAINLEVEL_OUTOF10: 0
PAINLEVEL_OUTOF10: 2
PAINLEVEL_OUTOF10: 0
PAINLEVEL_OUTOF10: 0

## 2018-09-26 NOTE — CARE COORDINATION
9/26/18, 2:48 PM    Discharge plan discussed by  and . Discharge plan reviewed with patient/ family. Patient/ family verbalize understanding of discharge plan and are in agreement with plan. Understanding was demonstrated using the teach back method. JD completed referral with Flores Cm with [facility], he will deliver home O2 tank for pt, they will file under Medicaid pending and have pt complete hardship form. JD spoke with pt regarding medications, assistance through [address] [place] [address]. Pharmacy Yudelka Negrete will assist with Meds. ELVA Rincon updated.

## 2018-09-26 NOTE — PROGRESS NOTES
Hospital Medicine Progress Note     Date:  9/25/2018    PCP: No primary care provider on file. (Tel: None)    Date of Admission: 9/19/2018    Chief complaint:   Chief Complaint   Patient presents with   Benjie Newer of Breath    Wheezing    Cough         Kiki Poon MD  -------------------------------  Danielle hospitalist      Hospitalist Progress Note    Patient:  Roldan Thao      Unit/Bed:[facility]/008-A    YOB: 1975    MRN: [id number]       Acct: [de-identified]     PCP: No primary care provider on file. Date of Admission: 9/19/2018    Chief Complaint: SOB    Hospital Course: Patient was seen, examined and the medical chart was reviewed thoroughly today. Took over care today. Subjective: feeling much better today. Reports less need for puffers. Also. C/o puffers giving her tachycardia and anxiety.       Medications:  Reviewed    Infusion Medications    dextrose       Scheduled Medications    ceFAZolin  1 g Intravenous Q8H    [START ON 9/26/2018] predniSONE  40 mg Oral Daily    diltiazem  60 mg Oral 4 times per day    cetirizine  10 mg Oral Daily    diphenhydrAMINE  50 mg Oral Nightly    montelukast  10 mg Oral Nightly    pantoprazole  40 mg Oral QAM AC    enoxaparin  40 mg Subcutaneous BID    aspirin  81 mg Oral Daily    insulin lispro  0-6 Units Subcutaneous TID WC    insulin lispro  0-3 Units Subcutaneous Nightly    sodium chloride flush  10 mL Intravenous 2 times per day    guaiFENesin  600 mg Oral BID    busPIRone  10 mg Oral TID     PRN Meds: benzonatate, promethazine-codeine, albuterol sulfate HFA, albuterol, hydrOXYzine, calcium carbonate, glucose, dextrose, glucagon (rDNA), dextrose, sodium chloride flush, magnesium hydroxide, ondansetron, potassium chloride **OR** potassium chloride **OR** potassium chloride, potassium chloride, magnesium sulfate, acetaminophen      Intake/Output Summary (Last 24 hours) at 09/25/18 7591  Last data filed at 09/25/18 5073   Sara RBCUA 0-2 09/19/2018    BLOODU NEGATIVE 09/19/2018    SPECGRAV 1.018 09/19/2018       Radiology:  XR CHEST STANDARD (2 VW)   Final Result   1. Mild peribronchial thickening. 2. New left lower lobe opacity posteriorly. This can indicate mild atelectasis or an early pneumonia. **This report has been created using voice recognition software. It may contain minor errors which are inherent in voice recognition technology. **      Final report electronically signed by Dr. Shasta Roger on 9/22/2018 4:35 PM      CT Facial Bones WO Contrast   Final Result       1. No evidence to suggest acute sinusitis. 2. There is mucosal coaptation which obstructs the bilateral sphenoethmoidal recesses and left ostiomeatal unit. Mucosal coaptation causes narrowing of the right ostiomeatal unit. 3. There is an S-shaped nasal septal deviation. **This report has been created using voice recognition software. It may contain minor errors which are inherent in voice recognition technology. **      Final report electronically signed by Dr. Manuel Astudillo on 9/21/2018 9:17 AM      CTA Chest W WO Contrast   Final Result   Within The limitations discussed above there is no acute process identified. No central or proximal branch pulmonary emboli are seen. **This report has been created using voice recognition software. It may contain minor errors which are inherent in voice recognition technology. **      Final report electronically signed by Dr. Alphonse Raines on 9/19/2018 9:30 AM      XR CHEST STANDARD (2 VW)   Final Result   1. Unremarkable PA and lateral views of the chest.            **This report has been created using voice recognition software. It may contain minor errors which are inherent in voice recognition technology. **      Final report electronically signed by Dr. Alec Abraham on 9/19/2018 6:59 AM          Diet: DIET GENERAL; No Added Salt (3-4 GM)    DVT prophylaxis: [x] Lovenox [] SCDs                                 [] SQ Heparin                                 [] Encourage ambulation           [] Already on Anticoagulation     Disposition:    [x] Home       [] TCU       [] Rehab       [] Psych       [] SNF       [] [place]       [] Other-    Code Status: Full Code    PT/OT Eval Status: N/A    Assessment/Plan:    Anticipated Discharge in : 1-2 days    Active Hospital Problems    Diagnosis Date Noted    MSSA (methicillin susceptible Staphylococcus aureus) pneumonia ([facility] [J15.211] 09/23/2018    Anxiety disorder [F41.9]     Morbid obesity with BMI of 45.0-49.9, adult ([facility] [E66.01, Z68.42]     History of asthma [Z87.09]     Acute asthma exacerbation [J45.901] 09/19/2018    Asthmatic bronchitis [J45.909]     Viral upper respiratory tract infection [J06.9]     Sepsis due to pneumonia ([facility] [J18.9, A41.9]     Psoriasis [L40.9]     Acute respiratory failure with hypercapnia (HCC) [J96.02]     Leukocytosis [D72.829]     Sinus tachycardia [R00.0]        1. Acute asthma exacerbation: improving on treatment including prednsione. Pulmonology following. Home O2 eval completed and qualified for home O2 at 1 lpm via [place].  2. MSSA pneumonia. Likely present on admission (although no infiltrate on admission CXR). as the underlying cause for exacerbation. On IV ancef as per ID. Blood cultures no growth so far. ID to advise re: choice of PO Abx and duration o  3. Acute hypercapnic respiratory failure. Resolved. Continue weaning her off oxygen completely. For formal spirometry as outpt to r/o underlying COPD  4. Leukocytosis: persisting despite clinical improvement. WBC 26.2. Consider 2D echo if blood cultures positive  5. Sinus tachycardia. Likely secondary to anxiety/ MENDY. Cardiac scan negative. No recurrence of CP. Pt started on Buspirone for anxiety by Dr Ledy León on admission which reportedly helped her.  Also cardiology note once, suggesting

## 2018-09-26 NOTE — PROGRESS NOTES
dyspnea. Shedenies any fever, chills or rigors at this time. Past 24 hour events/ROS  Continues to feel better  92% on 1 Liter  Afebrile  For discharge today      PMHx   Past Medical History      Diagnosis Date    Anxiety disorder     History of asthma     Morbid obesity with BMI of 45.0-49.9, adult [facility])       Past Surgical History        Procedure Laterality Date    CARDIOVASCULAR STRESS TEST  08/24/11    EF 67%    DIAGNOSTIC CARDIAC CATH LAB PROCEDURE  08/29/11    EF 60%    DOPPLER ECHOCARDIOGRAPHY  08/23/11    EF 60%    KNEE ARTHROSCOPY      vicky knee    TONSILLECTOMY       Meds    Current Medications    ceFAZolin  1 g Intravenous Q8H    predniSONE  40 mg Oral Daily    diltiazem  60 mg Oral 4 times per day    cetirizine  10 mg Oral Daily    diphenhydrAMINE  50 mg Oral Nightly    montelukast  10 mg Oral Nightly    pantoprazole  40 mg Oral QAM AC    enoxaparin  40 mg Subcutaneous BID    aspirin  81 mg Oral Daily    insulin lispro  0-6 Units Subcutaneous TID WC    insulin lispro  0-3 Units Subcutaneous Nightly    sodium chloride flush  10 mL Intravenous 2 times per day    guaiFENesin  600 mg Oral BID    busPIRone  10 mg Oral TID     benzonatate, promethazine-codeine, albuterol sulfate HFA, albuterol, hydrOXYzine, calcium carbonate, glucose, dextrose, glucagon (rDNA), dextrose, sodium chloride flush, magnesium hydroxide, ondansetron, potassium chloride **OR** potassium chloride **OR** potassium chloride, potassium chloride, magnesium sulfate, acetaminophen  IV Drips/Infusions   dextrose       Diet    DIET GENERAL; No Added Salt (3-4 GM)  Allergies    Patient has no known allergies. Vitals     height is 5' 9\" (1.753 m) and weight is 303 lb 6.4 oz (137.6 kg) (abnormal). Her oral temperature is 98.7 °F (37.1 °C). Her blood pressure is 124/77 and her pulse is 89. Her respiration is 20 and oxygen saturation is 92%. Body mass index is 44.8 kg/m².     SUPPLEMENTAL O2: O2 Flow Rate (L/min): 1 discussed above there is no acute process identified. No central or proximal branch pulmonary emboli are seen. CT sinuses 9/21/18   FINDINGS:       Frontal sinuses: Normally aerated and pneumatized. The frontal ethmoidal recesses are patent.       Ethmoid air cells: Normally aerated and pneumatized. The lamina papyracea are intact. The cribriform plates, lateral lamella and fovea ethmoidalis are relatively symmetric.       Sphenoid sinus: Normally aerated and pneumatized. Mucosal coaptation obstructs the bilateral sphenoethmoidal recesses.       Maxillary sinuses: Normally aerated and pneumatized. Mucosal coaptation narrows the right ostiomeatal unit and there is mucosal coaptation which obstructs the left ostiomeatal unit.       Nasal cavity: There is an S-shaped nasal septal deviation. Note is made that the right inferior nasal turbinate and right middle nasal turbinate abuts the wall of the nasal cavity which may correspond to the nasal cycle or nasal turbinate hypertrophy. No    polyps or masses are noted.       The mastoid air cells and middle ear cavities are normally aerated.       There are no suspicious findings in the imaged aspects of the brain parenchyma and facial soft tissues.           Impression       1. No evidence to suggest acute sinusitis.       2. There is mucosal coaptation which obstructs the bilateral sphenoethmoidal recesses and left ostiomeatal unit. Mucosal coaptation causes narrowing of the right ostiomeatal unit.       3. There is an S-shaped nasal septal deviation.           Assessment   Acute asthma exacerbation with LLL MSSA CAP  History of childhood asthma  Possible COPD with a history of tobacco smoking for 20 years: She quit smoking 1year and 8 months back and has no second hand exposure.   Seasonal/Environmental Allergies on Zyrtec  GERD on PPI  Elevated troponin level: Stress test showed no ischemia  Anxiety disorder  Morbid obesity with BMI 46  Full Code    Plan   -OK for

## 2018-09-26 NOTE — PROGRESS NOTES
Pneumonia due to MSSA  Asthma  Reactive air way: may have underlying COPD  Will discharge with oral keflex  Follow up with pulmonary.           Samantha White MD, FACP 9/26/2018 4:16 PM

## 2018-09-26 NOTE — DISCHARGE SUMMARY
for Congestion             naproxen (NAPROSYN) 250 MG tablet  Take 250 mg by mouth 2 times daily (with meals)             omeprazole (PRILOSEC) 10 MG delayed release capsule  Take 10 mg by mouth daily             predniSONE (DELTASONE) 10 MG tablet  40 mg for 3 days then 30 mg for 3 days then 20 mg for 3 days then 10 mg for 3 days then stop                 Time Spent on discharge is more than 30 minutes in the examination, evaluation, counseling and review of medications and discharge plan. 1. Acute asthma exacerbation: improved. Cleared for d/c by pulmonology for script for puffers and tapering prednisone  2. MSSA pneumonia. Likely present on admission (although no infiltrate on admission CXR). ID recommended stepping down to Keflex PO x10 days. Cleared for discharge  3. Acute hypercapnic respiratory failure. Resolved. Continue weaning her off oxygen completely. For formal spirometry as outpt to r/o underlying COPD  4. Leukocytosis: down to 21.2 from 26.2 likely secondary to steroids. B/Cs no growth. 5. Sinus tachycardia. Likely secondary to anxiety/ MENDY. on Buspirone for anxiety by Dr Dianna Alatorre on admission which reportedly helped her. Also cardiology note once, suggesting continuing w/ ASA and Diltiazem as ordered by Dr Dianna Alatorre with f/u with PCP. Script provided for ASA, Dilt ER 20 mg PO OD x 7days with f/u with PCP to r/a need to continue. and  buspirone x 7 days again with f/u with PCP in 7 days to ra. Also discussed this with t, asked her to f/u with PCP with possible psych consultation  6. Dispo: Home with pulmonology follow-up and full spirometry to assess for possible concomittant COPD    Patient was updated about the treatment plan, all the questions and concerns were addressed. Instructions regarding alarming signs and symptoms provided. Signed: Thank you No primary care provider on file. for the opportunity to be involved in this patient's care.     Electronically signed by Shiloh Carlisle MD

## 2018-09-27 ENCOUNTER — TELEPHONE (OUTPATIENT)
Dept: INTERNAL MEDICINE CLINIC | Age: 43
End: 2018-09-27

## 2018-09-27 NOTE — TELEPHONE ENCOUNTER
[facility] Transitions Initial Follow Up Call    Call within 2 business days of discharge: Yes     Patient: Danisha Iverson Patient : 1975   MRN: [id number]  Reason for Admission: There are no discharge diagnoses documented for the most recent discharge. Discharge Date: 18 RARS: Readmission Risk Score: 12     Spoke with: First attempt to reach patient , left message on patient's voicemail to call the office. [facility]     Non-face-to-face services provided: Transition of Care questions.        Follow Up  Future Appointments  Date Time Provider Tiffany Fisher   10/2/2018 1:45 PM VIOLA Whitney CNP [facility] P - [place]   10/29/2018 1:00 PM Deshawn Barnard MD [facility] Physic [address]   2018 10:00 AM STR PULMONARY FUNCTION ROOM 1 [facility] PFT None   2018 9:30 AM VIOLA Teague CNP Pulm Med P - Wilfred Griggs LPN

## 2018-10-08 ENCOUNTER — NURSE ONLY (OUTPATIENT)
Dept: INTERNAL MEDICINE CLINIC | Age: 43
End: 2018-10-08
Payer: MEDICAID

## 2018-10-08 ENCOUNTER — OFFICE VISIT (OUTPATIENT)
Dept: INTERNAL MEDICINE CLINIC | Age: 43
End: 2018-10-08
Payer: MEDICAID

## 2018-10-08 VITALS
HEIGHT: 69 IN | HEART RATE: 92 BPM | WEIGHT: 293 LBS | DIASTOLIC BLOOD PRESSURE: 83 MMHG | OXYGEN SATURATION: 94 % | BODY MASS INDEX: 43.4 KG/M2 | SYSTOLIC BLOOD PRESSURE: 133 MMHG | RESPIRATION RATE: 16 BRPM

## 2018-10-08 DIAGNOSIS — J15.211 PNEUMONIA OF LEFT LOWER LOBE DUE TO METHICILLIN SUSCEPTIBLE STAPHYLOCOCCUS AUREUS (MSSA) (HCC): ICD-10-CM

## 2018-10-08 DIAGNOSIS — J18.9 SEPSIS DUE TO PNEUMONIA (HCC): Primary | ICD-10-CM

## 2018-10-08 DIAGNOSIS — D72.829 LEUKOCYTOSIS, UNSPECIFIED TYPE: ICD-10-CM

## 2018-10-08 DIAGNOSIS — F41.9 ANXIETY: ICD-10-CM

## 2018-10-08 DIAGNOSIS — A41.9 SEPSIS DUE TO PNEUMONIA (HCC): Primary | ICD-10-CM

## 2018-10-08 PROBLEM — J45.901 ASTHMA WITH ACUTE EXACERBATION: Status: RESOLVED | Noted: 2018-09-19 | Resolved: 2018-10-08

## 2018-10-08 LAB — WBC # BLD: 12.5 THOU/MM3 (ref 4.8–10.8)

## 2018-10-08 PROCEDURE — 99495 TRANSJ CARE MGMT MOD F2F 14D: CPT | Performed by: NURSE PRACTITIONER

## 2018-10-08 PROCEDURE — 36415 COLL VENOUS BLD VENIPUNCTURE: CPT | Performed by: NURSE PRACTITIONER

## 2018-10-08 RX ORDER — DULOXETIN HYDROCHLORIDE 30 MG/1
CAPSULE, DELAYED RELEASE ORAL
Qty: 45 CAPSULE | Refills: 0 | Status: SHIPPED | OUTPATIENT
Start: 2018-10-08 | End: 2019-03-27

## 2018-10-08 ASSESSMENT — ENCOUNTER SYMPTOMS
COLOR CHANGE: 0
EYE PAIN: 0
ABDOMINAL DISTENTION: 0
SINUS PRESSURE: 0
BLOOD IN STOOL: 0
SHORTNESS OF BREATH: 0
CONSTIPATION: 0
DIARRHEA: 0
CHEST TIGHTNESS: 0
NAUSEA: 0
SORE THROAT: 0
BACK PAIN: 0
PHOTOPHOBIA: 0
VOMITING: 0
COUGH: 0
ABDOMINAL PAIN: 0

## 2018-10-08 ASSESSMENT — PATIENT HEALTH QUESTIONNAIRE - PHQ9
SUM OF ALL RESPONSES TO PHQ QUESTIONS 1-9: 0
SUM OF ALL RESPONSES TO PHQ QUESTIONS 1-9: 0
1. LITTLE INTEREST OR PLEASURE IN DOING THINGS: 0
2. FEELING DOWN, DEPRESSED OR HOPELESS: 0
SUM OF ALL RESPONSES TO PHQ9 QUESTIONS 1 & 2: 0

## 2018-10-08 NOTE — PROGRESS NOTES
chills, cough, SOB, weakness, dizziness, palpitations. She stopped using her home O2 because her home O2 sats were 95%. She hasn't needed her albuterol inhaler. She stopped taking Flovent and says she didn't know she was to continue to take it. She completed abx and prednisone as pulmonology recommended. She is to follow up with pulmonology in 3 months with CXR and PFT prior. Last WBC elevated 21.2 prior to discharge likely secondary to steroids. .     History of anxiety  She used to be on Paxil and Wellbutrin from age [age] per her PCP in [place]. Paxil helped with anxiety but it did have adverse effect of decreased libido. She'd also been on Celexa, Zoloft in the past and reports similar side effect. She associates her anxiety in dealing with her x- several years ago, however she reports it flares up fairly regularly with stressful events. She says being on Prednisone recently increased her anxiety. She was put on Buspar for 2 weeks - ran out 10 days ago. She didn't like Buspar due to side effects of loss of libido. She does c/o Intermittent difficulty concentrating. No suicidal or homicidal ideations. She was having chest pain in hospital and cardiac work up was done and was unremarkable. She was started on Cartia XT 240mg ER daily x 7 days for tachycardia and was told to f/u with PCP to determine if she needed to continue to be on it. Recent TSH unremarkable. She lives with her  and 15and 13year old step-children. She does not have health insurance and is Medicaid pending.        No Known Allergies    Social History   Substance Use Topics    Smoking status: Former Smoker     Packs/day: 1.00     Years: 14.00     Types: Cigarettes     Start date: 2011     Quit date: 2017    Smokeless tobacco: Never Used    Alcohol use No       Past Medical History:   Diagnosis Date    Anxiety disorder     History of asthma     Morbid obesity with BMI of 45.0-49.9, adult ([address]        Past Surgical History:

## 2018-10-08 NOTE — PROGRESS NOTES
Venipuncture performed on right arm and specimen obtained . Patient tolerated the procedure without complications or complaints.

## 2018-10-09 ENCOUNTER — TELEPHONE (OUTPATIENT)
Dept: INTERNAL MEDICINE CLINIC | Age: 43
End: 2018-10-09

## 2018-10-09 NOTE — TELEPHONE ENCOUNTER
----- Message from VIOLA Parsons CNP sent at 10/8/2018  5:27 PM EDT -----  WBC still a little elevated but is much improved. Likely due to steroids and recent pneumonia. No further action needed at this time. F/u with PCP as scheduled.

## 2018-10-15 ENCOUNTER — TELEPHONE (OUTPATIENT)
Dept: INTERNAL MEDICINE CLINIC | Age: 43
End: 2018-10-15

## 2019-02-28 ENCOUNTER — APPOINTMENT (OUTPATIENT)
Dept: GENERAL RADIOLOGY | Age: 44
End: 2019-02-28
Payer: COMMERCIAL

## 2019-02-28 ENCOUNTER — HOSPITAL ENCOUNTER (EMERGENCY)
Age: 44
Discharge: HOME | End: 2019-02-28
Payer: COMMERCIAL

## 2019-02-28 VITALS
RESPIRATION RATE: 16 BRPM | HEART RATE: 107 BPM | SYSTOLIC BLOOD PRESSURE: 120 MMHG | OXYGEN SATURATION: 92 % | HEIGHT: 69 IN | WEIGHT: 293 LBS | TEMPERATURE: 98 F | BODY MASS INDEX: 43.4 KG/M2 | DIASTOLIC BLOOD PRESSURE: 79 MMHG

## 2019-02-28 DIAGNOSIS — J18.9 PNEUMONIA DUE TO ORGANISM: Primary | ICD-10-CM

## 2019-02-28 LAB
FLU A ANTIGEN: NEGATIVE
FLU B ANTIGEN: NEGATIVE

## 2019-02-28 PROCEDURE — 71046 X-RAY EXAM CHEST 2 VIEWS: CPT

## 2019-02-28 PROCEDURE — 99284 EMERGENCY DEPT VISIT MOD MDM: CPT

## 2019-02-28 PROCEDURE — 6370000000 HC RX 637 (ALT 250 FOR IP): Performed by: NURSE PRACTITIONER

## 2019-02-28 PROCEDURE — 87804 INFLUENZA ASSAY W/OPTIC: CPT

## 2019-02-28 RX ORDER — BENZONATATE 100 MG/1
100 CAPSULE ORAL ONCE
Status: COMPLETED | OUTPATIENT
Start: 2019-02-28 | End: 2019-02-28

## 2019-02-28 RX ORDER — PREDNISONE 10 MG/1
TABLET ORAL
Qty: 20 TABLET | Refills: 0 | Status: SHIPPED | OUTPATIENT
Start: 2019-02-28 | End: 2019-03-10

## 2019-02-28 RX ORDER — BENZONATATE 100 MG/1
100 CAPSULE ORAL 3 TIMES DAILY PRN
Qty: 30 CAPSULE | Refills: 0 | Status: SHIPPED | OUTPATIENT
Start: 2019-02-28 | End: 2019-03-07

## 2019-02-28 RX ORDER — AZITHROMYCIN 250 MG/1
TABLET, FILM COATED ORAL
Qty: 1 PACKET | Refills: 0 | Status: SHIPPED | OUTPATIENT
Start: 2019-02-28 | End: 2019-03-10

## 2019-02-28 RX ADMIN — BENZONATATE 100 MG: 100 CAPSULE ORAL at 13:38

## 2019-02-28 ASSESSMENT — ENCOUNTER SYMPTOMS
SINUS PRESSURE: 0
VOICE CHANGE: 0
EYE REDNESS: 0
CONSTIPATION: 0
ABDOMINAL DISTENTION: 0
NAUSEA: 0
CHEST TIGHTNESS: 0
RHINORRHEA: 1
BACK PAIN: 0
SHORTNESS OF BREATH: 0
COUGH: 1
ABDOMINAL PAIN: 0
BLOOD IN STOOL: 0
WHEEZING: 1
DIARRHEA: 0
VOMITING: 0
COLOR CHANGE: 0
PHOTOPHOBIA: 0
SORE THROAT: 0

## 2019-03-26 ENCOUNTER — OFFICE VISIT (OUTPATIENT)
Dept: INTERNAL MEDICINE CLINIC | Age: 44
End: 2019-03-26
Payer: COMMERCIAL

## 2019-03-26 VITALS
SYSTOLIC BLOOD PRESSURE: 146 MMHG | DIASTOLIC BLOOD PRESSURE: 78 MMHG | HEIGHT: 69 IN | WEIGHT: 293 LBS | BODY MASS INDEX: 43.4 KG/M2 | HEART RATE: 100 BPM

## 2019-03-26 DIAGNOSIS — L40.9 PSORIASIS: ICD-10-CM

## 2019-03-26 DIAGNOSIS — Z00.00 ROUTINE CHECK-UP: Primary | ICD-10-CM

## 2019-03-26 PROCEDURE — 93000 ELECTROCARDIOGRAM COMPLETE: CPT | Performed by: NURSE PRACTITIONER

## 2019-03-26 PROCEDURE — G0444 DEPRESSION SCREEN ANNUAL: HCPCS | Performed by: NURSE PRACTITIONER

## 2019-03-26 PROCEDURE — 99204 OFFICE O/P NEW MOD 45 MIN: CPT | Performed by: NURSE PRACTITIONER

## 2019-03-26 ASSESSMENT — PATIENT HEALTH QUESTIONNAIRE - PHQ9
7. TROUBLE CONCENTRATING ON THINGS, SUCH AS READING THE NEWSPAPER OR WATCHING TELEVISION: 0
10. IF YOU CHECKED OFF ANY PROBLEMS, HOW DIFFICULT HAVE THESE PROBLEMS MADE IT FOR YOU TO DO YOUR WORK, TAKE CARE OF THINGS AT HOME, OR GET ALONG WITH OTHER PEOPLE: 1
SUM OF ALL RESPONSES TO PHQ QUESTIONS 1-9: 9
8. MOVING OR SPEAKING SO SLOWLY THAT OTHER PEOPLE COULD HAVE NOTICED. OR THE OPPOSITE, BEING SO FIGETY OR RESTLESS THAT YOU HAVE BEEN MOVING AROUND A LOT MORE THAN USUAL: 0
SUM OF ALL RESPONSES TO PHQ9 QUESTIONS 1 & 2: 6
2. FEELING DOWN, DEPRESSED OR HOPELESS: 3
9. THOUGHTS THAT YOU WOULD BE BETTER OFF DEAD, OR OF HURTING YOURSELF: 1
4. FEELING TIRED OR HAVING LITTLE ENERGY: 0
SUM OF ALL RESPONSES TO PHQ QUESTIONS 1-9: 9
6. FEELING BAD ABOUT YOURSELF - OR THAT YOU ARE A FAILURE OR HAVE LET YOURSELF OR YOUR FAMILY DOWN: 2
1. LITTLE INTEREST OR PLEASURE IN DOING THINGS: 3
3. TROUBLE FALLING OR STAYING ASLEEP: 0
5. POOR APPETITE OR OVEREATING: 0

## 2019-03-26 NOTE — PROGRESS NOTES
[facility]  [facility]. [facility] [address] [place], [address]  Dept: [phone]  Dept Fax: Chris Ball [phone]  1975  [phone]  4/2/19    Chief Complaint   Patient presents with    New Patient     est PCP       This patient presents for medical evaluation to establish PCP. I have not seen this patient previously. She has not had a PCP. Moved here from [place] 10 years ago. Was hospitalized in September for pneumonia. Past Medical History-   Anxiety, asthma, pneumonia, psoriasis   Past Surgical History-  Knee surgery x2, tonsillectomy  Family History-  Unknown- she was adopted    Zahira presents today to establish PCP. Gynecological concerns-  She is initially upset because we do not perform pap smears here in the office, and she has not had a mammogram and/or pap smear in 5 years. She also voices that she wants tubal ligation. She is  with 1 child. She also reports that her sex drive is low. She reports that her menstrual cycle occurs every 28 days, lasts 4-5 days, and is of moderate heaviness. Referred to OBGYN to examine and treat. Provided information regarding routine mammogram and contact information given for the [facility]. Psoriasis-  Zahiar reports she was diagnosed with psoriasis as a child. She states she seen a dermatologist many years ago and tried many different creams and shampoos and none were effective. She voices that she was on Prednisone in the hospital and it cleared up her psoriasis so she would like to be on it, low dose, long term. I explained to her that she also required insulin during this time, the risks of developing diabetes in addition to the risks she already has and that this is not the best option. Explained to her that she will need a dermatological referral to discuss possible options.      Anxiety-  When she was discharged from the hospital in 9/2019, she was discharged with Buspirone, ASA, reapplying; not to use on face, armpits, groin. 454 g 3    fluocinonide (LIDEX) 0.05 % external solution AAA on scalp BID; can apply onto cotton ball and use on eyebrows and inside ears if needed. 1 Bottle 5    alclomethasone (ACLOVATE) 0.05 % cream AAA BID on anterior hairline and face. After 2 week use, wait one week prior to reapplying. 1 Tube 3    cetirizine (ZYRTEC) 10 MG tablet Take 10 mg by mouth daily       No current facility-administered medications for this visit. Allergies   Allergen Reactions    Celexa [Citalopram Hydrobromide]      Decreased libido - also with Zoloft, Paxil, Buspar       Review of Systems - General ROS: negative for - chills, fatigue, fever, hot flashes, night sweats or sleep disturbance  Psychological ROS: positive for - anxiety and decreased libido. negative for - depression  Hematological and Lymphatic ROS: No history of blood clots or bleeding disorder. Respiratory ROS: positive for - shortness of breath on exertion, unchanged. negative for - cough, hemoptysis or wheezing  Cardiovascular ROS: negative for - chest pain, edema or palpitations  Gastrointestinal ROS: no abdominal pain, change in bowel habits, or black or bloody stools  Genito-Urinary ROS: no dysuria, trouble voiding, or hematuria  Musculoskeletal ROS: negative  Neurological ROS: negative for - headaches, impaired coordination/balance, numbness/tingling or visual changes  Dermatological ROS: positive for - psoriatic lesions    Vitals:    03/26/19 1418   BP: (!) 146/78   Pulse: 100       Physical Examination:   Constitutional - Alert, cooperative, well groomed, and in no distress. Vital signs stable   Vitals:    03/26/19 1418   BP: (!) 146/78   Pulse: 100     Mental status - Alert and oriented to person, place, and time. Good insight, appropriate responses, mood appropriate. Head - Atraumatic, normocephalic.    Eyes - Pupils equal and reactive to light, extraocular movements intact  Ears - External ears are normal, hearing is grossly normal to whispered voice and finger rub, otoscopic exam completed. TM visualized bilaterally, pearly gray, without erythema and/or fluids. Mouth - Oropharynx clear, mucous membranes pink and moist, dentition intact. Neck - supple, no significant adenopathy, no JVD. No thyroid enlargement. Chest - No distress. No increased work of breathing. Clear to auscultation in all fields, no wheezes, rales or rhonchi, symmetric air entry. Heart - normal rate, regular rhythm, normal S1, S2, no murmurs, rubs  or gallops  Abdomen -soft, nontender, nondistended. No organomegaly. Neurological - alert, oriented, cranial nerves II-XII intact without noted deficits, motor and sensation are grossly intact bilateral upper and lower extremities. Extremities - peripheral pulses normal, no pedal edema, no clubbing or cyanosis  Skin - warm and dry, no rashes, lesions, or wounds evident    Diagnostic Data:  I have reviewed recent diagnostic testing including labs, EKG, radiology results. Please see EKG for interpretation.     Lab Results   Component Value Date     03/27/2019    K 4.5 03/27/2019     03/27/2019    CO2 25 03/27/2019    BUN 15 03/27/2019    CREATININE 0.7 03/27/2019    GLUCOSE 96 03/27/2019    GLUCOSE 87 08/29/2011    CALCIUM 9.1 03/27/2019      Lab Results   Component Value Date    LABA1C 5.0 09/19/2018     No results found for: EAG  Lab Results   Component Value Date    CHOL 184 03/27/2019    CHOL 179 09/21/2018    CHOL 178 11/30/2015     Lab Results   Component Value Date    TRIG 177 03/27/2019    TRIG 93 09/21/2018    TRIG 262 (H) 11/30/2015     Lab Results   Component Value Date    HDL 51 03/27/2019    HDL 62 09/21/2018    HDL 41 11/30/2015     Lab Results   Component Value Date    LDLCALC 98 03/27/2019    LDLCALC 98 09/21/2018    LDLCALC 85 11/30/2015     No results found for: LABVLDL, VLDL  No results found for: CHOLHDLRATIO  Lab Results   Component Value Date    WBC 8.2 03/27/2019    HGB 14.2 03/27/2019    HCT 44.5 03/27/2019    MCV 88.5 03/27/2019     03/27/2019         Assessment:     Diagnosis Orders   1. Routine check-up  Lipid Panel    Comprehensive Metabolic Panel    CBC    EKG 12 Lead   2. [facility]       Plan:    1. Encourage PFTs again at next appointment  2. CMP, CBC, Lipid Panel now  3. EKG today in office  4. Schedule appointment with OBGYN for routine screenings, and to discuss hormonal problems  5. Call [facility] and schedule mammogram  6. Refer to dermatology for psoriasis treatment options  7. Schedule AWV is 3 months      Electronically signed by VIOLA Oneill CNP 04/02/19 8:06 AM     [address]  [place], [address]     Phone number: [phone]  Fax number: [phone]

## 2019-03-27 ENCOUNTER — HOSPITAL ENCOUNTER (OUTPATIENT)
Age: 44
Discharge: HOME | End: 2019-03-27
Payer: COMMERCIAL

## 2019-03-27 ENCOUNTER — OFFICE VISIT (OUTPATIENT)
Dept: DERMATOLOGY | Age: 44
End: 2019-03-27
Payer: COMMERCIAL

## 2019-03-27 VITALS — WEIGHT: 293 LBS | BODY MASS INDEX: 49.91 KG/M2

## 2019-03-27 DIAGNOSIS — L40.9 PSORIASIS: Primary | ICD-10-CM

## 2019-03-27 DIAGNOSIS — Z00.00 ROUTINE CHECK-UP: ICD-10-CM

## 2019-03-27 LAB
ALBUMIN SERPL-MCNC: 3.8 G/DL (ref 3.5–5.1)
ALP BLD-CCNC: 76 U/L (ref 38–126)
ALT SERPL-CCNC: 43 U/L (ref 11–66)
ANION GAP SERPL CALCULATED.3IONS-SCNC: 13 MEQ/L (ref 8–16)
AST SERPL-CCNC: 39 U/L (ref 5–40)
BILIRUB SERPL-MCNC: 0.4 MG/DL (ref 0.3–1.2)
BUN BLDV-MCNC: 15 MG/DL (ref 7–22)
CALCIUM SERPL-MCNC: 9.1 MG/DL (ref 8.5–10.5)
CHLORIDE BLD-SCNC: 101 MEQ/L (ref 98–111)
CHOLESTEROL, TOTAL: 184 MG/DL (ref 100–199)
CO2: 25 MEQ/L (ref 23–33)
CREAT SERPL-MCNC: 0.7 MG/DL (ref 0.4–1.2)
ERYTHROCYTE [DISTWIDTH] IN BLOOD BY AUTOMATED COUNT: 13.7 % (ref 11.5–14.5)
ERYTHROCYTE [DISTWIDTH] IN BLOOD BY AUTOMATED COUNT: 44.4 FL (ref 35–45)
GFR SERPL CREATININE-BSD FRML MDRD: > 90 ML/MIN/1.73M2
GLUCOSE BLD-MCNC: 96 MG/DL (ref 70–108)
HCT VFR BLD CALC: 44.5 % (ref 37–47)
HDLC SERPL-MCNC: 51 MG/DL
HEMOGLOBIN: 14.2 GM/DL (ref 12–16)
LDL CHOLESTEROL CALCULATED: 98 MG/DL
MCH RBC QN AUTO: 28.2 PG (ref 26–33)
MCHC RBC AUTO-ENTMCNC: 31.9 GM/DL (ref 32.2–35.5)
MCV RBC AUTO: 88.5 FL (ref 81–99)
PLATELET # BLD: 357 THOU/MM3 (ref 130–400)
PMV BLD AUTO: 9 FL (ref 9.4–12.4)
POTASSIUM SERPL-SCNC: 4.5 MEQ/L (ref 3.5–5.2)
RBC # BLD: 5.03 MILL/MM3 (ref 4.2–5.4)
SODIUM BLD-SCNC: 139 MEQ/L (ref 135–145)
TOTAL PROTEIN: 7.1 G/DL (ref 6.1–8)
TRIGL SERPL-MCNC: 177 MG/DL (ref 0–199)
WBC # BLD: 8.2 THOU/MM3 (ref 4.8–10.8)

## 2019-03-27 PROCEDURE — 99203 OFFICE O/P NEW LOW 30 MIN: CPT | Performed by: DERMATOLOGY

## 2019-03-27 PROCEDURE — 80061 LIPID PANEL: CPT

## 2019-03-27 PROCEDURE — 85027 COMPLETE CBC AUTOMATED: CPT

## 2019-03-27 PROCEDURE — 36415 COLL VENOUS BLD VENIPUNCTURE: CPT

## 2019-03-27 PROCEDURE — 80053 COMPREHEN METABOLIC PANEL: CPT

## 2019-03-27 RX ORDER — ALCLOMETASONE DIPROPIONATE 0.5 MG/G
CREAM TOPICAL
Qty: 1 TUBE | Refills: 3 | Status: SHIPPED | OUTPATIENT
Start: 2019-03-27 | End: 2020-01-27

## 2019-03-27 RX ORDER — TRIAMCINOLONE ACETONIDE 1 MG/G
CREAM TOPICAL
Qty: 454 G | Refills: 3 | Status: SHIPPED | OUTPATIENT
Start: 2019-03-27 | End: 2020-01-27

## 2019-03-27 RX ORDER — FLUOCINONIDE TOPICAL SOLUTION USP, 0.05% 0.5 MG/ML
SOLUTION TOPICAL
Qty: 1 BOTTLE | Refills: 5 | Status: SHIPPED | OUTPATIENT
Start: 2019-03-27 | End: 2020-01-27

## 2019-10-03 ENCOUNTER — OFFICE VISIT (OUTPATIENT)
Dept: INTERNAL MEDICINE CLINIC | Age: 44
End: 2019-10-03
Payer: COMMERCIAL

## 2019-10-03 VITALS
WEIGHT: 293 LBS | HEART RATE: 88 BPM | BODY MASS INDEX: 43.4 KG/M2 | HEIGHT: 69 IN | SYSTOLIC BLOOD PRESSURE: 136 MMHG | DIASTOLIC BLOOD PRESSURE: 84 MMHG

## 2019-10-03 DIAGNOSIS — J45.909 UNCOMPLICATED ASTHMA, UNSPECIFIED ASTHMA SEVERITY, UNSPECIFIED WHETHER PERSISTENT: ICD-10-CM

## 2019-10-03 DIAGNOSIS — L40.9 PSORIASIS: ICD-10-CM

## 2019-10-03 DIAGNOSIS — F41.9 ANXIETY: ICD-10-CM

## 2019-10-03 DIAGNOSIS — Z12.39 SCREENING FOR BREAST CANCER: Primary | ICD-10-CM

## 2019-10-03 PROCEDURE — 99214 OFFICE O/P EST MOD 30 MIN: CPT | Performed by: NURSE PRACTITIONER

## 2019-10-03 RX ORDER — CLOBETASOL PROPIONATE 0.05 G/100ML
1 SHAMPOO TOPICAL PRN
Qty: 2 BOTTLE | Refills: 1 | Status: SHIPPED | OUTPATIENT
Start: 2019-10-03 | End: 2020-01-27

## 2019-10-03 RX ORDER — METHYLPREDNISOLONE 4 MG/1
TABLET ORAL
Qty: 1 KIT | Refills: 0 | Status: SHIPPED | OUTPATIENT
Start: 2019-10-03 | End: 2019-10-09

## 2019-10-03 RX ORDER — ALBUTEROL SULFATE 90 UG/1
2 AEROSOL, METERED RESPIRATORY (INHALATION) EVERY 4 HOURS PRN
Qty: 1 INHALER | Refills: 3 | Status: SHIPPED | OUTPATIENT
Start: 2019-10-03 | End: 2020-07-07 | Stop reason: SDUPTHER

## 2019-10-15 ENCOUNTER — OFFICE VISIT (OUTPATIENT)
Dept: INTERNAL MEDICINE CLINIC | Age: 44
End: 2019-10-15
Payer: COMMERCIAL

## 2019-10-15 VITALS
DIASTOLIC BLOOD PRESSURE: 86 MMHG | HEART RATE: 105 BPM | HEIGHT: 69 IN | BODY MASS INDEX: 43.4 KG/M2 | WEIGHT: 293 LBS | RESPIRATION RATE: 16 BRPM | SYSTOLIC BLOOD PRESSURE: 139 MMHG | OXYGEN SATURATION: 96 %

## 2019-10-15 DIAGNOSIS — T31.10 BURNS INVOLVING 10-19% OF BODY SURFACE: Primary | ICD-10-CM

## 2019-10-15 PROCEDURE — 99214 OFFICE O/P EST MOD 30 MIN: CPT | Performed by: NURSE PRACTITIONER

## 2019-10-15 RX ORDER — HYDROCODONE BITARTRATE AND ACETAMINOPHEN 5; 325 MG/1; MG/1
TABLET ORAL
Refills: 0 | COMMUNITY
Start: 2019-10-08 | End: 2019-10-15 | Stop reason: SDUPTHER

## 2019-10-15 RX ORDER — SILVER SULFADIAZINE 1 %
CREAM (GRAM) TOPICAL
Refills: 0 | COMMUNITY
Start: 2019-10-08 | End: 2020-01-27

## 2019-10-15 RX ORDER — HYDROCODONE BITARTRATE AND ACETAMINOPHEN 5; 325 MG/1; MG/1
1 TABLET ORAL EVERY 6 HOURS PRN
Qty: 20 TABLET | Refills: 0 | Status: SHIPPED | OUTPATIENT
Start: 2019-10-15 | End: 2019-10-20

## 2019-11-05 ASSESSMENT — ENCOUNTER SYMPTOMS
ABDOMINAL PAIN: 0
SHORTNESS OF BREATH: 0
NAUSEA: 0
DIARRHEA: 0
PHOTOPHOBIA: 0
TROUBLE SWALLOWING: 0
BLOOD IN STOOL: 0
SINUS PAIN: 0
ABDOMINAL DISTENTION: 0
SINUS PRESSURE: 0
COUGH: 0
WHEEZING: 0
SORE THROAT: 0
VOMITING: 0
CONSTIPATION: 0
RHINORRHEA: 0

## 2019-11-09 ASSESSMENT — ENCOUNTER SYMPTOMS
COUGH: 0
BLOOD IN STOOL: 0
SORE THROAT: 0
PHOTOPHOBIA: 0
SHORTNESS OF BREATH: 0
SINUS PRESSURE: 0
TROUBLE SWALLOWING: 0
SINUS PAIN: 0
VOMITING: 0
NAUSEA: 0
RHINORRHEA: 0
CONSTIPATION: 0
DIARRHEA: 0
ABDOMINAL PAIN: 0
WHEEZING: 0
ABDOMINAL DISTENTION: 0

## 2020-01-22 ENCOUNTER — HOSPITAL ENCOUNTER (OUTPATIENT)
Dept: WOMENS IMAGING | Age: 45
Discharge: HOME | End: 2020-01-22
Payer: COMMERCIAL

## 2020-01-22 PROCEDURE — 77063 BREAST TOMOSYNTHESIS BI: CPT

## 2020-01-27 ENCOUNTER — HOSPITAL ENCOUNTER (OUTPATIENT)
Dept: GENERAL RADIOLOGY | Age: 45
Discharge: HOME | End: 2020-01-27
Payer: COMMERCIAL

## 2020-01-27 ENCOUNTER — OFFICE VISIT (OUTPATIENT)
Dept: INTERNAL MEDICINE CLINIC | Age: 45
End: 2020-01-27
Payer: COMMERCIAL

## 2020-01-27 ENCOUNTER — HOSPITAL ENCOUNTER (OUTPATIENT)
Age: 45
Discharge: HOME | End: 2020-01-27
Payer: COMMERCIAL

## 2020-01-27 VITALS
BODY MASS INDEX: 43.4 KG/M2 | HEIGHT: 69 IN | RESPIRATION RATE: 14 BRPM | HEART RATE: 89 BPM | WEIGHT: 293 LBS | DIASTOLIC BLOOD PRESSURE: 72 MMHG | SYSTOLIC BLOOD PRESSURE: 139 MMHG

## 2020-01-27 LAB
ALBUMIN SERPL-MCNC: 4.3 G/DL (ref 3.5–5.1)
ALP BLD-CCNC: 84 U/L (ref 38–126)
ALT SERPL-CCNC: 89 U/L (ref 11–66)
ANION GAP SERPL CALCULATED.3IONS-SCNC: 13 MEQ/L (ref 8–16)
AST SERPL-CCNC: 83 U/L (ref 5–40)
BILIRUB SERPL-MCNC: 0.4 MG/DL (ref 0.3–1.2)
BUN BLDV-MCNC: 10 MG/DL (ref 7–22)
CALCIUM SERPL-MCNC: 9.3 MG/DL (ref 8.5–10.5)
CHLORIDE BLD-SCNC: 101 MEQ/L (ref 98–111)
CHOLESTEROL, TOTAL: 183 MG/DL (ref 100–199)
CO2: 23 MEQ/L (ref 23–33)
CREAT SERPL-MCNC: 0.7 MG/DL (ref 0.4–1.2)
ERYTHROCYTE [DISTWIDTH] IN BLOOD BY AUTOMATED COUNT: 14.2 % (ref 11.5–14.5)
ERYTHROCYTE [DISTWIDTH] IN BLOOD BY AUTOMATED COUNT: 46.1 FL (ref 35–45)
FOLLICLE STIMULATING HORMONE: 22.8 MIU/ML (ref 0.6–16.9)
GFR SERPL CREATININE-BSD FRML MDRD: > 90 ML/MIN/1.73M2
GLUCOSE BLD-MCNC: 93 MG/DL (ref 70–108)
HCG,BETA SUBUNIT,QUAL,SERUM: 0.3 MIU/ML (ref 0–5)
HCT VFR BLD CALC: 46 % (ref 37–47)
HDLC SERPL-MCNC: 50 MG/DL
HEMOGLOBIN: 14.5 GM/DL (ref 12–16)
LDL CHOLESTEROL CALCULATED: 97 MG/DL
MCH RBC QN AUTO: 28.2 PG (ref 26–33)
MCHC RBC AUTO-ENTMCNC: 31.5 GM/DL (ref 32.2–35.5)
MCV RBC AUTO: 89.5 FL (ref 81–99)
PLATELET # BLD: 387 THOU/MM3 (ref 130–400)
PMV BLD AUTO: 9.5 FL (ref 9.4–12.4)
POTASSIUM SERPL-SCNC: 4 MEQ/L (ref 3.5–5.2)
PROGESTERONE LEVEL: < 0.05 NG/ML
RBC # BLD: 5.14 MILL/MM3 (ref 4.2–5.4)
SODIUM BLD-SCNC: 137 MEQ/L (ref 135–145)
TOTAL PROTEIN: 7.9 G/DL (ref 6.1–8)
TRIGL SERPL-MCNC: 179 MG/DL (ref 0–199)
WBC # BLD: 9.7 THOU/MM3 (ref 4.8–10.8)

## 2020-01-27 PROCEDURE — 83001 ASSAY OF GONADOTROPIN (FSH): CPT

## 2020-01-27 PROCEDURE — 82671 ASSAY OF ESTROGENS: CPT

## 2020-01-27 PROCEDURE — 99214 OFFICE O/P EST MOD 30 MIN: CPT | Performed by: NURSE PRACTITIONER

## 2020-01-27 PROCEDURE — 73560 X-RAY EXAM OF KNEE 1 OR 2: CPT

## 2020-01-27 PROCEDURE — 80061 LIPID PANEL: CPT

## 2020-01-27 PROCEDURE — 84702 CHORIONIC GONADOTROPIN TEST: CPT

## 2020-01-27 PROCEDURE — 36415 COLL VENOUS BLD VENIPUNCTURE: CPT

## 2020-01-27 PROCEDURE — 72100 X-RAY EXAM L-S SPINE 2/3 VWS: CPT

## 2020-01-27 PROCEDURE — 85027 COMPLETE CBC AUTOMATED: CPT

## 2020-01-27 PROCEDURE — 80053 COMPREHEN METABOLIC PANEL: CPT

## 2020-01-27 PROCEDURE — 84144 ASSAY OF PROGESTERONE: CPT

## 2020-01-27 RX ORDER — TIZANIDINE 4 MG/1
4 TABLET ORAL 3 TIMES DAILY PRN
Qty: 30 TABLET | Refills: 0 | Status: SHIPPED | OUTPATIENT
Start: 2020-01-27 | End: 2022-05-18 | Stop reason: SDUPTHER

## 2020-01-27 ASSESSMENT — PATIENT HEALTH QUESTIONNAIRE - PHQ9
1. LITTLE INTEREST OR PLEASURE IN DOING THINGS: 0
SUM OF ALL RESPONSES TO PHQ QUESTIONS 1-9: 0
2. FEELING DOWN, DEPRESSED OR HOPELESS: 0
SUM OF ALL RESPONSES TO PHQ QUESTIONS 1-9: 0
SUM OF ALL RESPONSES TO PHQ9 QUESTIONS 1 & 2: 0

## 2020-01-27 NOTE — PROGRESS NOTES
a child. Continues to refuse pulmonary referral. Was ordered PFTs in 9/2018 by pulmonary, but never completed them. I stressed to her the importance of monitoring the severity of her asthma and treating her appropriately to prevent complications and worsening of symptoms. She does voice understanding. She has an albuterol rescue inhaler which she reports she has not had to use recently.         Zahira complains of lower back pain without sciatica. The pain has progressively worsened over the years. 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 Also has severe bilateral knee pain. Some crepitus felt with flexion. No range of motion abnormalities noted. No neuro deficits upon examination. Tylenol and Motrin are not effective. Aggravated by activity. Has followed with rheumatology in the past as well. Questionable psoriatic arthritic changes. Xray of Lumbar Spine 11/30/15  Impression   IMPRESSION: Degenerative changes, slightly more prominent than on the previous study of 2/7/12. No acute findings. Xray Cervical Spine 11/30/15  Impression   IMPRESSION: Slight dextroscoliosis and degenerative changes, similar to the previous study of 9/25/14. Review of Systems   Constitutional: Negative for chills, fatigue and fever. HENT: Negative for congestion, rhinorrhea, sinus pressure, sinus pain, sore throat, tinnitus and trouble swallowing. Eyes: Negative for photophobia and visual disturbance. Respiratory: Negative for cough, shortness of breath and wheezing. Cardiovascular: Negative for chest pain, palpitations and leg swelling. Gastrointestinal: Negative for abdominal distention, abdominal pain, blood in stool, constipation, diarrhea, nausea and vomiting. Endocrine: Negative for polydipsia, polyphagia and polyuria. Genitourinary: Positive for menstrual problem. Negative for difficulty urinating, dysuria, frequency, hematuria and urgency.    Musculoskeletal: Positive for arthralgias (back and bilateral knees) and back pain. Negative for myalgias. Skin: Positive for rash (psoriatic). Negative for wound. Neurological: Negative for dizziness, light-headedness and headaches. Psychiatric/Behavioral: Negative for dysphoric mood and sleep disturbance. The patient is not nervous/anxious. Prior to Visit Medications    Medication Sig Taking? Authorizing Provider   Multiple Vitamin (MULTI VITAMIN DAILY PO) Take by mouth Yes Historical Provider, MD   tiZANidine (ZANAFLEX) 4 MG tablet Take 1 tablet by mouth 3 times daily as needed (muscle spasms) Yes VIOLA Lindsey CNP   albuterol sulfate  (90 Base) MCG/ACT inhaler Inhale 2 puffs into the lungs every 4 hours as needed for Wheezing or Shortness of Breath Yes VIOLA Lindsey CNP   aspirin 81 MG EC tablet Take 1 tablet by mouth daily Yes Lucia Bryan MD   omeprazole (PRILOSEC) 10 MG delayed release capsule Take 10 mg by mouth daily Yes Historical Provider, MD        Social History     Tobacco Use    Smoking status: Former Smoker     Packs/day: 1.00     Years: 14.00     Pack years: 14.00     Types: Cigarettes     Start date: 2011     Last attempt to quit: 2017     Years since quitting: 3.0    Smokeless tobacco: Never Used   Substance Use Topics    Alcohol use: No        Vitals:    01/27/20 1420   BP: 139/72   Site: Left Upper Arm   Position: Sitting   Cuff Size: Large Adult   Pulse: 89   Resp: 14   Weight: (!) 358 lb (162.4 kg)   Height: 5' 9.02\" (1.753 m)     Estimated body mass index is 52.84 kg/m² as calculated from the following:    Height as of this encounter: 5' 9.02\" (1.753 m). Weight as of this encounter: 358 lb (162.4 kg). Physical Exam  Constitutional:       General: She is not in acute distress. Appearance: Normal appearance. She is normal weight. She is not ill-appearing. HENT:      Head: Normocephalic and atraumatic.       Right Ear: Tympanic membrane, ear canal and external ear normal. Future  - tiZANidine (ZANAFLEX) 4 MG tablet; Take 1 tablet by mouth 3 times daily as needed (muscle spasms)  Dispense: 30 tablet; Refill: 0  - Consider referral to ortho vs rheumatology based on xray findings    2. Chronic pain of left knee    - XR KNEE LEFT (1-2 VIEWS); Future    3. Chronic pain of right knee    - XR KNEE RIGHT (1-2 VIEWS); Future    4. Amenorrhea    - HCG, Quantitative, Pregnancy; Future  - Follicle Stimulating Hormone; Future  - Estrogens, Fractionated; Future  - Progesterone; Future  - Comprehensive Metabolic Panel; Future  - CBC; Future  - Lipid Panel; Future  - Schedule appointment with GYN    5. Psoriasis    - Continue current treatment- controlled currently    6. Anxiety    - Controlled    7. Asthma, unspecified asthma severity, unspecified whether complicated, unspecified whether persistent    - Declines PFTs and pulmonary referral   - Use rescue inhaler as needed      Return in about 6 months (around 7/27/2020). An electronic signature was used to authenticate this note.     --VIOLA Marie - CNP on 2/6/2020 at 8:44 AM

## 2020-01-28 ENCOUNTER — TELEPHONE (OUTPATIENT)
Dept: INTERNAL MEDICINE CLINIC | Age: 45
End: 2020-01-28

## 2020-01-28 NOTE — TELEPHONE ENCOUNTER
----- Message from VIOLA Johnson CNP sent at 1/28/2020  8:01 AM EST -----  Refer to ortho for back pain and to discuss xray.

## 2020-01-28 NOTE — TELEPHONE ENCOUNTER
----- Message from VIOLA Rooney CNP sent at 1/28/2020  8:00 AM EST -----  Knee xrays reveal degenerative changes.  What was she seeing Dr. Doe Henson for in the past?

## 2020-01-28 NOTE — TELEPHONE ENCOUNTER
----- Message from VIOLA Canseco CNP sent at 1/28/2020  7:59 AM EST -----  Please call patient and let her know she is not pregnant. Make an appointment with GYN asap to discuss other concerns. Her liver enzymes are elevated. Repeat in 2 months.

## 2020-01-31 LAB — ESTROGENS, FRACTIONATED, SERUM: NORMAL

## 2020-02-06 ASSESSMENT — ENCOUNTER SYMPTOMS
COUGH: 0
VOMITING: 0
SINUS PAIN: 0
ABDOMINAL DISTENTION: 0
NAUSEA: 0
BLOOD IN STOOL: 0
PHOTOPHOBIA: 0
CONSTIPATION: 0
WHEEZING: 0
SORE THROAT: 0
RHINORRHEA: 0
DIARRHEA: 0
ABDOMINAL PAIN: 0
SINUS PRESSURE: 0
SHORTNESS OF BREATH: 0
TROUBLE SWALLOWING: 0
BACK PAIN: 1

## 2020-07-07 RX ORDER — ALBUTEROL SULFATE 90 UG/1
2 AEROSOL, METERED RESPIRATORY (INHALATION) EVERY 4 HOURS PRN
Qty: 1 INHALER | Refills: 3 | Status: SHIPPED | OUTPATIENT
Start: 2020-07-07 | End: 2020-12-09 | Stop reason: SDUPTHER

## 2020-07-07 NOTE — TELEPHONE ENCOUNTER
Last visit- 1/27/2020  Next visit- 7/27/2020    Requested Prescriptions     Pending Prescriptions Disp Refills    albuterol sulfate  (90 Base) MCG/ACT inhaler 1 Inhaler 3     Sig: Inhale 2 puffs into the lungs every 4 hours as needed for Wheezing or Shortness of [address]

## 2020-07-17 LAB
ALBUMIN SERPL-MCNC: 4.1 G/DL (ref 3.2–5.3)
ALK PHOSPHATASE: 78 U/L (ref 39–130)
ALT SERPL-CCNC: 51 U/L (ref 0–31)
AST SERPL-CCNC: 46 U/L (ref 0–41)
BILIRUB SERPL-MCNC: 0.6 MG/DL (ref 0.3–1.2)
BILIRUBIN DIRECT: 0.1 MG/DL (ref 0–0.4)
TOTAL PROTEIN: 7.2 G/DL (ref 6–8)

## 2020-07-20 ENCOUNTER — TELEPHONE (OUTPATIENT)
Dept: INTERNAL MEDICINE CLINIC | Age: 45
End: 2020-07-20

## 2020-07-20 NOTE — TELEPHONE ENCOUNTER
----- Message from VIOLA Simmons CNP sent at 7/17/2020  7:44 AM EDT -----  Liver enzymes have improved. Recheck in 3 months.

## 2020-07-20 NOTE — TELEPHONE ENCOUNTER
Patient informed liver enzymes improved and to recheck in 3 months. Lab slip mailed to patient to have completed in 3 months.

## 2020-12-09 RX ORDER — ALBUTEROL SULFATE 90 UG/1
2 AEROSOL, METERED RESPIRATORY (INHALATION) EVERY 4 HOURS PRN
Qty: 1 INHALER | Refills: 3 | Status: SHIPPED | OUTPATIENT
Start: 2020-12-09 | End: 2021-12-02 | Stop reason: SDUPTHER

## 2021-12-02 DIAGNOSIS — J45.909 ASTHMA, UNSPECIFIED ASTHMA SEVERITY, UNSPECIFIED WHETHER COMPLICATED, UNSPECIFIED WHETHER PERSISTENT: ICD-10-CM

## 2021-12-02 RX ORDER — ALBUTEROL SULFATE 90 UG/1
2 AEROSOL, METERED RESPIRATORY (INHALATION) EVERY 4 HOURS PRN
Qty: 18 G | Refills: 1 | Status: SHIPPED | OUTPATIENT
Start: 2021-12-02 | End: 2022-05-18 | Stop reason: SDUPTHER

## 2022-01-03 ENCOUNTER — TELEPHONE (OUTPATIENT)
Dept: INTERNAL MEDICINE CLINIC | Age: 47
End: 2022-01-03

## 2022-01-03 NOTE — TELEPHONE ENCOUNTER
Pt called requesting Mammo referral from . Tried returning pt call to inform her she has to make an appt with KD for her to be able to put the referral in.

## 2022-05-18 ENCOUNTER — OFFICE VISIT (OUTPATIENT)
Dept: INTERNAL MEDICINE CLINIC | Age: 47
End: 2022-05-18

## 2022-05-18 VITALS
WEIGHT: 293 LBS | HEIGHT: 69 IN | SYSTOLIC BLOOD PRESSURE: 130 MMHG | BODY MASS INDEX: 43.4 KG/M2 | DIASTOLIC BLOOD PRESSURE: 74 MMHG

## 2022-05-18 DIAGNOSIS — Z00.00 WELLNESS EXAMINATION: ICD-10-CM

## 2022-05-18 DIAGNOSIS — G89.29 CHRONIC BILATERAL LOW BACK PAIN WITH BILATERAL SCIATICA: ICD-10-CM

## 2022-05-18 DIAGNOSIS — Z12.31 ENCOUNTER FOR SCREENING MAMMOGRAM FOR BREAST CANCER: Primary | ICD-10-CM

## 2022-05-18 DIAGNOSIS — M54.42 CHRONIC BILATERAL LOW BACK PAIN WITH BILATERAL SCIATICA: ICD-10-CM

## 2022-05-18 DIAGNOSIS — J45.909 ASTHMA, UNSPECIFIED ASTHMA SEVERITY, UNSPECIFIED WHETHER COMPLICATED, UNSPECIFIED WHETHER PERSISTENT: ICD-10-CM

## 2022-05-18 DIAGNOSIS — M54.41 CHRONIC BILATERAL LOW BACK PAIN WITH BILATERAL SCIATICA: ICD-10-CM

## 2022-05-18 PROCEDURE — 99214 OFFICE O/P EST MOD 30 MIN: CPT | Performed by: NURSE PRACTITIONER

## 2022-05-18 RX ORDER — ALBUTEROL SULFATE 90 UG/1
2 AEROSOL, METERED RESPIRATORY (INHALATION) EVERY 4 HOURS PRN
Qty: 18 G | Refills: 0 | Status: SHIPPED | OUTPATIENT
Start: 2022-05-18 | End: 2022-06-20

## 2022-05-18 RX ORDER — TIZANIDINE 4 MG/1
4 TABLET ORAL 3 TIMES DAILY PRN
Qty: 30 TABLET | Refills: 0 | Status: SHIPPED | OUTPATIENT
Start: 2022-05-18

## 2022-05-18 NOTE — PROGRESS NOTES
[facility]NAL [facility]IC[facility] MEDICATION MANAGEMENT  [address] [facility]  [address] [facility],[address]  Dept: [phone]  Dept Fax: [phone]: [phone]     Visit Date:  2022    Patient:  Sonia Talavera  YOB: 1975    HPI:     Sonia Talavera (:  1975) is a 38 y.o. female, here for evaluation of the following medical concerns: breast abnormality, amenorrhea, psoriasis, anxiety, asthma, and lower back pain     I last seen Zahira > 2 years ago. She has not had any ER visits or hospitalizations since last visit. Planning on moving to [place], [place].      Gynecological concerns-  Zahira still has not followed up with GYN as instructed. She is  with 1 child. Sex drive continues to be low. She feels that she is experiencing menopause symptoms as well such as hot flashes, vaginal dryness, and mood swings. Unchanged since last visit. Last January she noticed a lump in her left breast. She states that the lump went away after several days and has had no issues since, until recently. Recently she noticed that her left breast feels extremely heavy and like there is fluid in it. She has not had a menstrual cycle in > 3 months.     Psoriasis-  Has been using a brand of shampoo and conditioner, called WOW and reports her psoriasis is under control currently. She has not followed up with dermatology. Zahira reports she was diagnosed with psoriasis as a child. She states she seen a dermatologist many years ago and tried many different creams and shampoos and none were effective. Currently well controlled.     Anxiety-  When she was discharged from the hospital in 2019, she was discharged with Buspirone, ASA, and diltiazem, in which she has not taken any of them since discharge. She was tachycardic, cardiac consultation and evaluation were completed.  Tachycardia possibly secondary to anxiety and/or MENDY use. Today she feels that her anxiety is well controlled. She denies suicidal or homicidal thoughts.      Asthma-  Reports she has had asthma since she was a child. Continues to refuse pulmonary referral. Was ordered PFTs in 9/2018 by pulmonary, but never completed them. I stressed to her the importance of monitoring the severity of her asthma and treating her appropriately to prevent complications and worsening of symptoms. She does voice understanding. She has an albuterol rescue inhaler which she reports she has not had to use recently.         Zahira complains of lower back pain without sciatica. The pain has progressively worsened over the years. 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 Also has severe bilateral knee pain. Some crepitus felt with flexion. No range of motion abnormalities noted. No neuro deficits upon examination. Tylenol and Motrin are not effective. Aggravated by activity. Has followed with rheumatology in the past as well. Questionable psoriatic arthritic changes.      Xray of Lumbar Spine 11/30/15  Impression   IMPRESSION: Degenerative changes, slightly more prominent than on the previous study of 2/7/12. No acute findings.      Xray Cervical Spine 11/30/15  Impression   IMPRESSION: Slight dextroscoliosis and degenerative changes, similar to the previous study of 9/25/14.        Medications    Current Outpatient Medications:     albuterol sulfate  (90 Base) MCG/ACT inhaler, Inhale 2 puffs into the lungs every 4 hours as needed for Wheezing or Shortness of Breath, Disp: 18 g, Rfl: 0    tiZANidine (ZANAFLEX) 4 MG tablet, Take 1 tablet by mouth 3 times daily as needed (muscle spasms), Disp: 30 tablet, Rfl: 0    Multiple Vitamin (MULTI VITAMIN DAILY PO), Take by mouth, Disp: , Rfl:     aspirin 81 MG EC tablet, Take 1 tablet by mouth daily, Disp: 30 tablet, Rfl: 3    omeprazole (PRILOSEC) 10 MG delayed release capsule, Take 10 mg by mouth daily, Disp: , Rfl:     The patient is allergic to celexa [employer] hydrobromide]. Past Medical History  Zahira  has a past medical history of Anxiety disorder, History of asthma, Morbid obesity with BMI of 45.0-49.9, adult ([address] and Pneumonia. Past Surgical History  The patient  has a past surgical history that includes Knee arthroscopy; doppler echocardiography (08/23/11); cardiovascular stress test (08/24/11); Diagnostic Cardiac Cath Lab Procedure (08/29/11); and Tonsillectomy. Family History  This patient's family history includes Bipolar Disorder in her father; Hypertension in her father and mother. Social History  Zahira  reports that she quit smoking about 5 years ago. Her smoking use included cigarettes. She started smoking about 11 years ago. She has a 14.00 pack-year smoking history. She has never used smokeless tobacco. She reports that she does not drink alcohol and does not use drugs. Health Maintenance:    Health Maintenance   Topic Date Due    COVID-19 Vaccine (1) Never done    Depression Screen  Never done    HIV screen  Never done    Hepatitis C screen  Never done    DTaP/Tdap/Td vaccine (1 - Tdap) Never done    Cervical cancer screen  Never done    Colorectal Cancer Screen  08/28/2019    Flu vaccine (Season Ended) 09/01/2022    Lipids  01/27/2025    Hepatitis A vaccine  Aged Out    Hepatitis B vaccine  Aged Out    Hib vaccine  Aged Out    Meningococcal (ACWY) vaccine  Aged Out    Pneumococcal 0-64 years Vaccine  Aged Out       Subjective:      Review of Systems   Constitutional: Negative for chills, fatigue and fever. HENT: Negative for congestion, rhinorrhea, sinus pressure, sinus pain, sore throat, tinnitus and trouble swallowing. Eyes: Negative for photophobia and visual disturbance. Respiratory: Negative for cough, shortness of breath and wheezing. Cardiovascular: Negative for chest pain, palpitations and leg swelling.    Gastrointestinal: Negative for abdominal distention, abdominal pain, blood in stool, constipation, diarrhea, nausea and vomiting. Endocrine: Negative for polydipsia, polyphagia and polyuria. Genitourinary: Negative for difficulty urinating, dysuria, frequency, hematuria and urgency. Musculoskeletal: Negative for arthralgias and myalgias. Skin: Negative for rash and wound. Neurological: Negative for dizziness, light-headedness and headaches. Psychiatric/Behavioral: Negative for dysphoric mood and sleep disturbance. The patient is not nervous/anxious. Objective:     /74 (Site: Left Upper Arm, Position: Sitting, Cuff Size: Large Adult)   Ht 5' 9\" (1.753 m)   Wt (!) 331 lb (150.1 kg)   BMI 48.88 kg/m²     Physical Exam  Vitals reviewed. Constitutional:       General: She is not in acute distress. Appearance: Normal appearance. She is not ill-appearing. HENT:      Head: Normocephalic and atraumatic. Right Ear: External ear normal.      Left Ear: External ear normal.      Nose: Nose normal. No congestion or rhinorrhea. Mouth/Throat:      Mouth: Mucous membranes are moist.   Eyes:      Extraocular Movements: Extraocular movements intact. Conjunctiva/sclera: Conjunctivae normal.      Pupils: Pupils are equal, round, and reactive to light. Pulmonary:      Effort: Pulmonary effort is normal. No respiratory distress. Musculoskeletal:         General: Normal range of motion. Cervical back: Normal range of motion and neck supple. Right lower leg: No edema. Left lower leg: No edema. Skin:     General: Skin is warm and dry. Neurological:      General: No focal deficit present. Mental Status: She is alert and oriented to person, place, and time. Psychiatric:         Mood and Affect: Mood normal.         Behavior: Behavior normal.         Thought Content:  Thought content normal.         Judgment: Judgment normal.         Labs Reviewed 5/18/2022:    Lab Results   Component Value Date    WBC 9.7 01/27/2020    HGB 14.5 01/27/2020    HCT 46.0 01/27/2020  01/27/2020    CHOL 183 01/27/2020    TRIG 179 01/27/2020    HDL 50 01/27/2020    ALT 51 (H) 07/16/2020    AST 46 (H) 07/16/2020     01/27/2020    K 4.0 01/27/2020     01/27/2020    CREATININE 0.7 01/27/2020    BUN 10 01/27/2020    CO2 23 01/27/2020    TSH 1.860 09/19/2018    LABA1C 5.0 09/19/2018       Assessment/Plan      1. Asthma, unspecified asthma severity, unspecified whether complicated, unspecified whether persistent    - albuterol sulfate  (90 Base) MCG/ACT inhaler; Inhale 2 puffs into the lungs every 4 hours as needed for Wheezing or Shortness of Breath  Dispense: 18 g; Refill: 0    2. Chronic bilateral low back pain with bilateral sciatica    - tiZANidine (ZANAFLEX) 4 MG tablet; Take 1 tablet by mouth 3 times daily as needed (muscle spasms)  Dispense: 30 tablet; Refill: 0    3. Encounter for screening mammogram for breast cancer    - [facility] JAYJAY DIGITAL SCREEN BILATERAL; Future    4. Wellness examination    - CBC; Future  - Comprehensive Metabolic Panel; Future  - Lipid Panel; Future      Return in about 6 months (around 11/18/2022). Patient given educational materials - see patient instructions. Discussed use, benefit, and side effects of prescribed medications. All patient questions answered. Pt voiced understanding. Reviewed health maintenance.        Electronically signed VIOLA Wilburn CNP on 5/18/22 at 4:00 PM EDT

## 2022-05-24 ENCOUNTER — HOSPITAL ENCOUNTER (OUTPATIENT)
Dept: WOMENS IMAGING | Age: 47
Discharge: HOME | End: 2022-05-24

## 2022-05-24 DIAGNOSIS — Z12.31 ENCOUNTER FOR SCREENING MAMMOGRAM FOR BREAST CANCER: ICD-10-CM

## 2022-05-24 PROCEDURE — 77063 BREAST TOMOSYNTHESIS BI: CPT

## 2022-06-02 ASSESSMENT — ENCOUNTER SYMPTOMS
PHOTOPHOBIA: 0
BLOOD IN STOOL: 0
SINUS PAIN: 0
CONSTIPATION: 0
TROUBLE SWALLOWING: 0
SORE THROAT: 0
ABDOMINAL DISTENTION: 0
NAUSEA: 0
ABDOMINAL PAIN: 0
COUGH: 0
RHINORRHEA: 0
SINUS PRESSURE: 0
SHORTNESS OF BREATH: 0
VOMITING: 0
DIARRHEA: 0
WHEEZING: 0

## 2022-06-17 DIAGNOSIS — J45.909 ASTHMA, UNSPECIFIED ASTHMA SEVERITY, UNSPECIFIED WHETHER COMPLICATED, UNSPECIFIED WHETHER PERSISTENT: ICD-10-CM

## 2022-06-20 RX ORDER — ALBUTEROL SULFATE 90 UG/1
AEROSOL, METERED RESPIRATORY (INHALATION)
Qty: 18 G | Refills: 0 | Status: SHIPPED | OUTPATIENT
Start: 2022-06-20

## 2023-03-27 NOTE — ED NOTES
Patient transported to the floor in stable condition.       Everardo Hurt RN  09/19/18 0623
Pt placed on 4 L of oxygen via NC for an oxygen saturation of 83% on room air.      Flo Rachel RN  09/19/18 0059
Pt transported to [facility].      Ruiz Rodríguez, EMT-P  09/19/18 1158
Tarif Anzum